# Patient Record
Sex: MALE | Race: BLACK OR AFRICAN AMERICAN
[De-identification: names, ages, dates, MRNs, and addresses within clinical notes are randomized per-mention and may not be internally consistent; named-entity substitution may affect disease eponyms.]

---

## 2018-03-23 ENCOUNTER — HOSPITAL ENCOUNTER (OUTPATIENT)
Dept: HOSPITAL 35 - PAIN | Age: 63
End: 2018-03-23
Payer: COMMERCIAL

## 2018-03-23 VITALS — BODY MASS INDEX: 27.98 KG/M2 | HEIGHT: 75 IN | WEIGHT: 225 LBS

## 2018-03-23 VITALS — SYSTOLIC BLOOD PRESSURE: 146 MMHG | DIASTOLIC BLOOD PRESSURE: 93 MMHG

## 2018-03-23 DIAGNOSIS — E78.00: ICD-10-CM

## 2018-03-23 DIAGNOSIS — G89.29: ICD-10-CM

## 2018-03-23 DIAGNOSIS — M54.42: Primary | ICD-10-CM

## 2018-03-23 DIAGNOSIS — I10: ICD-10-CM

## 2018-03-23 DIAGNOSIS — M54.41: ICD-10-CM

## 2018-04-20 ENCOUNTER — HOSPITAL ENCOUNTER (OUTPATIENT)
Dept: HOSPITAL 35 - PAIN | Age: 63
End: 2018-04-20
Payer: COMMERCIAL

## 2018-04-20 VITALS — BODY MASS INDEX: 28.35 KG/M2 | WEIGHT: 228 LBS | HEIGHT: 75 IN

## 2018-04-20 VITALS — DIASTOLIC BLOOD PRESSURE: 86 MMHG | SYSTOLIC BLOOD PRESSURE: 143 MMHG

## 2018-04-20 DIAGNOSIS — F32.9: ICD-10-CM

## 2018-04-20 DIAGNOSIS — G89.29: ICD-10-CM

## 2018-04-20 DIAGNOSIS — I10: ICD-10-CM

## 2018-04-20 DIAGNOSIS — Z88.2: ICD-10-CM

## 2018-04-20 DIAGNOSIS — M54.5: Primary | ICD-10-CM

## 2018-04-20 DIAGNOSIS — E78.5: ICD-10-CM

## 2018-04-20 DIAGNOSIS — F41.9: ICD-10-CM

## 2018-04-20 DIAGNOSIS — Z98.890: ICD-10-CM

## 2018-06-22 ENCOUNTER — HOSPITAL ENCOUNTER (OUTPATIENT)
Dept: HOSPITAL 35 - PAIN | Age: 63
End: 2018-06-22
Payer: COMMERCIAL

## 2018-06-22 VITALS — HEIGHT: 75 IN | BODY MASS INDEX: 27.53 KG/M2 | WEIGHT: 221.4 LBS

## 2018-06-22 VITALS — DIASTOLIC BLOOD PRESSURE: 107 MMHG | SYSTOLIC BLOOD PRESSURE: 168 MMHG

## 2018-06-22 DIAGNOSIS — G89.29: Primary | ICD-10-CM

## 2018-06-22 DIAGNOSIS — F32.9: ICD-10-CM

## 2018-06-22 DIAGNOSIS — E78.00: ICD-10-CM

## 2018-06-22 DIAGNOSIS — F41.9: ICD-10-CM

## 2018-06-22 DIAGNOSIS — I10: ICD-10-CM

## 2018-10-26 ENCOUNTER — HOSPITAL ENCOUNTER (OUTPATIENT)
Dept: HOSPITAL 35 - PAIN | Age: 63
End: 2018-10-26
Attending: CLINICAL NURSE SPECIALIST
Payer: COMMERCIAL

## 2018-10-26 VITALS — HEIGHT: 75 IN | BODY MASS INDEX: 26.68 KG/M2 | WEIGHT: 214.6 LBS

## 2018-10-26 VITALS — DIASTOLIC BLOOD PRESSURE: 85 MMHG | SYSTOLIC BLOOD PRESSURE: 125 MMHG

## 2018-10-26 DIAGNOSIS — M25.552: ICD-10-CM

## 2018-10-26 DIAGNOSIS — F41.9: ICD-10-CM

## 2018-10-26 DIAGNOSIS — F32.9: ICD-10-CM

## 2018-10-26 DIAGNOSIS — I63.9: ICD-10-CM

## 2018-10-26 DIAGNOSIS — M25.551: ICD-10-CM

## 2018-10-26 DIAGNOSIS — Z79.899: ICD-10-CM

## 2018-10-26 DIAGNOSIS — G89.29: ICD-10-CM

## 2018-10-26 DIAGNOSIS — M54.5: Primary | ICD-10-CM

## 2019-01-04 ENCOUNTER — HOSPITAL ENCOUNTER (OUTPATIENT)
Dept: HOSPITAL 35 - PAIN | Age: 64
End: 2019-01-04
Attending: CLINICAL NURSE SPECIALIST
Payer: COMMERCIAL

## 2019-01-04 VITALS — BODY MASS INDEX: 27.6 KG/M2 | WEIGHT: 222 LBS | HEIGHT: 75 IN

## 2019-01-04 VITALS — SYSTOLIC BLOOD PRESSURE: 148 MMHG | DIASTOLIC BLOOD PRESSURE: 89 MMHG

## 2019-01-04 DIAGNOSIS — M25.551: Primary | ICD-10-CM

## 2019-01-04 DIAGNOSIS — Z96.643: ICD-10-CM

## 2019-01-04 DIAGNOSIS — I63.9: ICD-10-CM

## 2019-01-04 DIAGNOSIS — M25.552: ICD-10-CM

## 2019-01-04 DIAGNOSIS — F32.9: ICD-10-CM

## 2019-01-04 DIAGNOSIS — F41.9: ICD-10-CM

## 2019-01-04 DIAGNOSIS — G89.29: ICD-10-CM

## 2019-01-04 NOTE — NUR
Pain Clinic Assessment:
 
1. History of Osteoarthritis:
knees
   History of Rheumatoid Arthritis:
 
 
2. Height: 6 ft. 3 in. 190.5 cm.
   Weight: 222.0 lb.  oz. 100.699 kg.
   Patient's BMI: 27.7
 
3. Vital Signs:
   BP: 148/89 Pulse: 70 Resp: 20
   Temp:  02 Sat:  ECG Mon:
 
4. Pain Intensity: 9
 
5. Fall Risk:
   Dizziness:   Needs help standing or walking:
   Fallen in the last 3 months:
   Fall risk comments:
 
 
6. Patient on Blood Thinner: None
 
7. History of Hypertension: Y
 
8. Opioid Therapy greater than 6 weeks: Y
   Opiate Contract Signed: 03/23/18
 
9. Risk Assessment Tool Provided: LOW RISK
 
10. Functional Assessment Tool: 62/70
 
11. Recreational Drug Use: Never Drug Type:
    Tobacco Use: Former Smoker Tobacco Type:
       Amount or Packs/day:  How Many Years:
    Alcohol Use: No  Frequency:  Quant:

## 2019-01-07 NOTE — HPC
[facility]
[address]
[place]   [address]                     PAIN MANAGEMENT CONSULTATION  
_______________________________________________________________________________
 
Name:       VIVIANATOMAS PICKETT           Room #:                     REG [facility] 
M..#:      [id number]                       Account #:      [id number]  
Admission:  01/04/19    Attend Phys:    Hallie Ram     
Discharge:              Date of Birth:  [birth year]  
                                                          Report #: [id number]
                                                                    [id number]   
_______________________________________________________________________________
THIS REPORT FOR:   //name//                          
 
CC: Hallie Olveraen Gamaliel
 
DATE OF SERVICE:  01/04/2019
 
 
CHIEF COMPLAINT:  Low back pain and right hip pain.
 
HISTORY OF PRESENT ILLNESS:  This is a very pleasant 63-year-old male that
returns to the pain clinic today for his chronic pain in his back and his right
hip.  He has recently had a total hip replacement at [facility] by Dr. Maguire. 
He tells me that he went to  [facility] and then recently got home at
the end of last week.  There has been some misunderstanding with some of his
orders, and he had not started home physical therapy.  He has gone a week
without any physical therapy at home, and he had seen his primary care doctor
this morning who reordered home health for him, so hopefully he will get to
start his PT soon.  He tells me that the majority of his pain today is in his
hip.  It is a 9/10, a deep dull ache, worse with weightbearing.  This is the
most that he has had to walk since he has been discharged from rehab.  He does
not complain of constipation because he had been on a good bowel program while
in the facility for rehab, but states that he thinks it is going to be a problem
because he has stopped those medications upon discharge.  The patient also tells
me that he does have an appointment with Dr. Maguire this afternoon, his
surgeon.  He would like a refill of his oxycodone and OxyContin today.
 
ALLERGIES:  SULFA.
 
CURRENT LIST OF MEDICATIONS:  OxyContin 20 mg twice a day, oxycodone 10/325
twice a day, BenGay as needed, Cymbalta 20 mg daily, Flonase daily, metoprolol
 mg daily, Flexeril 10 mg as needed, diazepam 5 mg as needed and
hydrochlorothiazide 25 mg daily.
 
PQRS:
1.  He has a history of osteoarthritis in his knees and his hips bilaterally. 
Denies rheumatoid arthritis.
2.  Height is 6 feet 3 inches, weight is 222, BMI is 27.7.  Vital signs 148/89,
pulse of 70, respirations 20, oxygen sat is 100%.
3.  Pain score is 9/10.
4.  Fall risk.  He denies dizziness, does not need help walking or standing, has
not fallen in the last 3 months.
5.  Not on any current blood thinners.  Did take Lovenox while in
rehabilitation.  Last dose was 12/29.  Does have a history of hypertension and
takes medications.
6.  Opioid therapy is greater than 6 weeks, therefore, an opioid signed contract
 
 
 
[address]   [address]                     PAIN MANAGEMENT CONSULTATION  
_______________________________________________________________________________
 
Name:       TOMAS MICHELLE           Room #:                     REG MELECIO LEE#:      [id number]                       Account #:      [id number]  
Admission:  01/04/19    Attend Phys:    Hallie Ram     
Discharge:              Date of Birth:  [birth year]  
                                                          Report #: [id number]
                                                                    [id number]   
_______________________________________________________________________________
is on the chart.  His risk assessment tool is low, and his functional assessment
is 62:70.
7.  He denies recreational drug use.  He is a former smoker and does not drink
alcohol.  We did check prescription monitoring system.  He did fill
prescriptions from his surgeon, which made us aware of and otherwise just
medicines from our physicians.  The patient tells me that he does safeguard his
medications.
 
PHYSICAL EXAMINATION: 
GENERAL: This is a well-developed, well-nourished black gentleman of 63-year-old
that appears his stated age.  He is alert and orientated and his affect is
appropriate.
HEENT:  Normocephalic, atraumatic.  Extraocular eye muscles are intact.  Mucous
membranes are moist, and his hearing is within normal limits.
NECK:  Without adenopathy.  He has good range of motion and no JVD.
EXTREMITIES:  Upper extremity strength judged to be 5/5.  Lower extremity is
without kyphosis, lordosis or scoliosis.  His lower extremity strength judged to
be 4/5 on the right and 5/5 on the left.  He is using a cane today.  He has an
antalgic gait.  The patient tells me he has a new scar on his left hip, but we
did not look at that today.  Some slight swelling in his lower extremities of 1+
edema on his right lower leg.
 
IMPRESSION:
1.  Chronic pain, status post lumbar surgery.
2.  Bilateral hip pain with replacement bilaterally.
3.  Depression.
4.  Anxiety.
5.  Cardiovascular accident.
6.  Complex medical management for his chronic pain.
 
We reviewed the fact that opiate medications are being used to provide analgesia
adequate to support activities of daily living, not attempting to achieve a
specific pain score on the 0-10 Visual Analog Scale.  The current opiate
medications are providing sufficient analgesia to allow the patient to
participate in activities of daily living.  The patient is not exhibiting any
aberrant behavior suggestive of drug diversion.  The patient is not having any
adverse reactions to medications.  The patient is not suffering from daytime
somnolence or mental acuity changes.  The patient is managing opiate-induced
constipation with appropriate over-the-counter agents and dietary
considerations.  The patient was counseled on concern for caution with operating
a motor vehicle while using opiate medications.
 
A physical exam was performed and the patient's functional status was evaluated.
 All patients with back pain were advised against the bed rest greater than 4
days and were advised to return to normal activities.  Pain score assessment was
noted and the treatment plan was reviewed with the patient.  All current
 
 
 
[facility]
[address]
[place]   [address]                     PAIN MANAGEMENT CONSULTATION  
_______________________________________________________________________________
 
Name:       TERRYTOMAS JEFFERY           Room #:                     REG MELECIO LEE#:      [id number]                       Account #:      [id number]  
Admission:  01/04/19    Attend Phys:    Hallie DENNY Ram     
Discharge:              Date of Birth:  [birth year]  
                                                          Report #: [id number]
                                                                    [id number]   
_______________________________________________________________________________
medications, both prescribed and OTC were reviewed and reconciled on the
electronic medical record.  Tobacco screening was accomplished and smoking
cessation was advised when indicated.  BMI was noted and diet/exercise
modification was recommended for all patients following outside normal
parameters.
 
I reviewed with the patient today their responsibilities to safeguard
prescription medications, reviewed their responsibility to utilize medications
only as prescribed by the physician.  They are to seek and receive pain
medications only from 1 physician group ( [facility]).  They are to use 1
pharmacy and keep the clinic informed if they change pharmacies.  Their
responsibilities include making followup visits in a timely fashion and to avoid
abrupt discontinuation of medication usage.  Their responsibilities further
include bringing their medications (bottles from the pharmacy with residual
pills) to the visit for possible confirmation of pill counts and the patient
understands it is their responsibility to submit to random drug screens to
ensure both that the medications prescribed are present, and that no other
controlled substances are present.  All prescriptions provided today were
generated electronically.
 
PLAN: 
1.  We discussed treatment options with the patient today.  The patient tells me
that his pain has not been well controlled since he had his right hip replaced
at [facility].  He tells me they gave him a different short acting medication that
was not as helpful.  He is anxious to return to his short-acting oxycodone
10/325.  He continued to take his OxyContin 20 mg twice a day.  Scripts given
for both #60 with release of 4 today and for a week releases.
2.  I noticed and remembered that the patient had to stop his Meloxicam prior to
his surgery.  The patient has not restarted this.  This also could cause some of
his increase in his general aches and pains.  He has an appointment with Dr. Maguire this afternoon.  The patient is to ask him when he is able to restart
his meloxicam.
3.  We discussed constipation issues.  The patient had been doing well.  No
problems with constipation, but is fearing that since he had stopped his
medicine that he took in the rehab facility that it will start.  We addressed
taking MiraLax and Senokot, which are both over-the-counter medications.  The
patient wrote these down to look at the pharmacy.
4.  The patient will again start therapy per Dr. Alston's office for his hip
and hopeful that he will have decreasing pain over the next few months, we will
see him in 2 months' time.  At that time, we will address if we are able to
decrease any of his medications, but he does take these for his ongoing low back
pain as well as hip pain, so we may be unable to reduce them, but we will
reevaluate it at his 2-month appointment.  The patient is agreeable with this
 
 
 
[facility]
[address] [place]   [address]                     PAIN MANAGEMENT CONSULTATION  
_______________________________________________________________________________
 
Name:       TOMAS MICHELLE           Room #:                     REG MELECIO LEE#:      [id number]                       Account #:      [id number]  
Admission:  01/04/19    Attend Phys:    Hallie Ram     
Discharge:              Date of Birth:  [birth year]  
                                                          Report #: [id number]
                                                                    [id number]   
_______________________________________________________________________________
plan of care.  The patient is seen in collaboration today with Dr. Patricio Lewis.
 
 
 
 
 
 
 
 
 
 
 
 
 
 
 
 
 
 
 
 
 
 
 
 
 
 
 
 
 
 
 
 
 
 
 
 
 
 
 
 
 
 
  <ELECTRONICALLY SIGNED>
   By: Hallie Ram             
  01/07/19     0743
D: 01/04/19 1019                           _____________________________________
T: 01/04/19 1201                           Hallie Ram               /nt

## 2019-03-20 ENCOUNTER — HOSPITAL ENCOUNTER (OUTPATIENT)
Dept: HOSPITAL 35 - PAIN | Age: 64
End: 2019-03-20
Payer: COMMERCIAL

## 2019-03-20 VITALS — SYSTOLIC BLOOD PRESSURE: 150 MMHG | DIASTOLIC BLOOD PRESSURE: 110 MMHG

## 2019-03-20 VITALS — HEIGHT: 75 IN | BODY MASS INDEX: 29.83 KG/M2 | WEIGHT: 239.9 LBS

## 2019-03-20 DIAGNOSIS — Z86.73: ICD-10-CM

## 2019-03-20 DIAGNOSIS — Z96.641: ICD-10-CM

## 2019-03-20 DIAGNOSIS — Z88.8: ICD-10-CM

## 2019-03-20 DIAGNOSIS — F32.9: ICD-10-CM

## 2019-03-20 DIAGNOSIS — F41.9: ICD-10-CM

## 2019-03-20 DIAGNOSIS — Z79.899: ICD-10-CM

## 2019-03-20 DIAGNOSIS — G89.29: ICD-10-CM

## 2019-03-20 DIAGNOSIS — E78.00: Primary | ICD-10-CM

## 2019-03-20 NOTE — NUR
Pain Clinic Assessment:
 
1. History of Osteoarthritis:
knees
   History of Rheumatoid Arthritis:
 
 
2. Height: 6 ft. 3 in. 190.5 cm.
   Weight: 239.9 lb.  oz. 108.818 kg.
   Patient's BMI: 30.0
 
3. Vital Signs:
   BP: 150/110 Pulse: 16 Resp: 99
   Temp:  02 Sat: 98 ECG Mon:
 
4. Pain Intensity: 6-7
 
5. Fall Risk:
   Dizziness: Y  Needs help standing or walking: Y
   Fallen in the last 3 months: N
   Fall risk comments:
 
 
6. Patient on Blood Thinner: None
 
7. History of Hypertension: Y
 
8. Opioid Therapy greater than 6 weeks: Y
   Opiate Contract Signed: 03/23/18
 
9. Risk Assessment Tool Provided: LOW RISK
 
10. Functional Assessment Tool: 62/70
 
11. Recreational Drug Use: Never Drug Type:
    Tobacco Use: Former Smoker Tobacco Type:
       Amount or Packs/day:  How Many Years:
    Alcohol Use: No  Frequency:  Quant:

## 2019-04-17 ENCOUNTER — HOSPITAL ENCOUNTER (OUTPATIENT)
Dept: HOSPITAL 35 - PAIN | Age: 64
End: 2019-04-17
Payer: COMMERCIAL

## 2019-04-17 VITALS — WEIGHT: 227.2 LBS | HEIGHT: 75 IN | BODY MASS INDEX: 28.25 KG/M2

## 2019-04-17 VITALS — DIASTOLIC BLOOD PRESSURE: 101 MMHG | SYSTOLIC BLOOD PRESSURE: 145 MMHG

## 2019-04-17 DIAGNOSIS — Z96.641: ICD-10-CM

## 2019-04-17 DIAGNOSIS — G89.29: Primary | ICD-10-CM

## 2019-04-17 DIAGNOSIS — Z79.899: ICD-10-CM

## 2019-04-17 DIAGNOSIS — F32.9: ICD-10-CM

## 2019-04-17 DIAGNOSIS — M54.5: ICD-10-CM

## 2019-04-17 DIAGNOSIS — I10: ICD-10-CM

## 2019-04-17 DIAGNOSIS — F41.9: ICD-10-CM

## 2019-04-17 DIAGNOSIS — Z86.73: ICD-10-CM

## 2019-04-17 NOTE — NUR
Pain Clinic Assessment:
 
1. History of Osteoarthritis:
knees
   History of Rheumatoid Arthritis:
 
 
2. Height: 6 ft. 3 in. 190.5 cm.
   Weight: 227.2 lb.  oz. 103.057 kg.
   Patient's BMI: 28.4
 
3. Vital Signs:
   BP: 145/101 Pulse: 110 Resp: 20
   Temp:  02 Sat: 99 ECG Mon:
 
4. Pain Intensity: 8
 
5. Fall Risk:
   Dizziness: Y  Needs help standing or walking: Y
   Fallen in the last 3 months: N
   Fall risk comments:
 
 
6. Patient on Blood Thinner: None
 
7. History of Hypertension: Y
 
8. Opioid Therapy greater than 6 weeks: Y
   Opiate Contract Signed: 03/23/18
 
9. Risk Assessment Tool Provided: LOW RISK
 
10. Functional Assessment Tool: 62/70
 
11. Recreational Drug Use: Never Drug Type:
    Tobacco Use: Former Smoker Tobacco Type:
       Amount or Packs/day:  How Many Years:
    Alcohol Use: No  Frequency:  Quant:

## 2019-07-03 ENCOUNTER — HOSPITAL ENCOUNTER (OUTPATIENT)
Dept: HOSPITAL 35 - PAIN | Age: 64
End: 2019-07-03
Payer: COMMERCIAL

## 2019-07-03 VITALS — WEIGHT: 240 LBS | HEIGHT: 75 IN | BODY MASS INDEX: 29.84 KG/M2

## 2019-07-03 VITALS — SYSTOLIC BLOOD PRESSURE: 149 MMHG | DIASTOLIC BLOOD PRESSURE: 102 MMHG

## 2019-07-03 DIAGNOSIS — M54.5: Primary | ICD-10-CM

## 2019-07-03 DIAGNOSIS — Z79.899: ICD-10-CM

## 2019-07-03 DIAGNOSIS — Z96.641: ICD-10-CM

## 2019-07-03 DIAGNOSIS — E78.00: ICD-10-CM

## 2019-07-03 DIAGNOSIS — F32.9: ICD-10-CM

## 2019-07-03 DIAGNOSIS — G89.29: ICD-10-CM

## 2019-07-03 DIAGNOSIS — I10: ICD-10-CM

## 2019-07-03 DIAGNOSIS — F41.9: ICD-10-CM

## 2019-07-03 NOTE — NUR
Pain Clinic Assessment:
 
1. History of Osteoarthritis:
knees
   History of Rheumatoid Arthritis:
NONE
 
2. Height: 6 ft. 3 in. 190.5 cm.
   Weight: 240.0 lb.  oz. 108.864 kg.
   Patient's BMI: 30.0
 
3. Vital Signs:
   BP: 149/102 Pulse: 74 Resp: 16
   Temp:  02 Sat: 99 ECG Mon:
 
4. Pain Intensity: 5
 
5. Fall Risk:
   Dizziness: N  Needs help standing or walking: N
   Fallen in the last 3 months: N
   Fall risk comments:
 
 
6. Patient on Blood Thinner: None
 
7. History of Hypertension: Y
 
8. Opioid Therapy greater than 6 weeks: Y
   Opiate Contract Signed: 03/23/18
 
9. Risk Assessment Tool Provided: LOW RISK
 
10. Functional Assessment Tool: 62/70
 
11. Recreational Drug Use: Never Drug Type:
    Tobacco Use: Former Smoker Tobacco Type:
       Amount or Packs/day:  How Many Years:
    Alcohol Use: No  Frequency:  Quant:

## 2019-09-27 ENCOUNTER — HOSPITAL ENCOUNTER (OUTPATIENT)
Dept: HOSPITAL 35 - PAIN | Age: 64
End: 2019-09-27
Payer: COMMERCIAL

## 2019-09-27 VITALS — HEIGHT: 75 IN | BODY MASS INDEX: 28.42 KG/M2 | WEIGHT: 228.6 LBS

## 2019-09-27 VITALS — DIASTOLIC BLOOD PRESSURE: 85 MMHG | SYSTOLIC BLOOD PRESSURE: 125 MMHG

## 2019-09-27 DIAGNOSIS — Z88.2: ICD-10-CM

## 2019-09-27 DIAGNOSIS — F32.9: ICD-10-CM

## 2019-09-27 DIAGNOSIS — Z79.891: ICD-10-CM

## 2019-09-27 DIAGNOSIS — E78.00: ICD-10-CM

## 2019-09-27 DIAGNOSIS — Z79.899: ICD-10-CM

## 2019-09-27 DIAGNOSIS — I10: ICD-10-CM

## 2019-09-27 DIAGNOSIS — Z96.641: ICD-10-CM

## 2019-09-27 DIAGNOSIS — F41.9: ICD-10-CM

## 2019-09-27 DIAGNOSIS — M54.5: Primary | ICD-10-CM

## 2019-09-27 NOTE — NUR
Pain Clinic Assessment:
 
1. History of Osteoarthritis:
knees
   History of Rheumatoid Arthritis:
NONE
 
2. Height: 6 ft. 3 in. 190.5 cm.
   Weight: 228.6 lb.  oz. 103.692 kg.
   Patient's BMI: 28.6
 
3. Vital Signs:
   BP: 125/85 Pulse: 99 Resp: 16
   Temp:  02 Sat: 99 ECG Mon:
 
4. Pain Intensity: 5
 
5. Fall Risk:
   Dizziness: N  Needs help standing or walking: N
   Fallen in the last 3 months: N
   Fall risk comments:
 
 
6. Patient on Blood Thinner: None
 
7. History of Hypertension: Y
 
8. Opioid Therapy greater than 6 weeks: Y
   Opiate Contract Signed: 03/23/18
 
9. Risk Assessment Tool Provided: LOW RISK
 
10. Functional Assessment Tool: 62/70
 
11. Recreational Drug Use: Never Drug Type:
    Tobacco Use: Former Smoker Tobacco Type:
       Amount or Packs/day:  How Many Years:
    Alcohol Use: No  Frequency:  Quant:

## 2019-10-11 NOTE — HPC
[facility]
[address]
[place]   [address]                     PAIN MANAGEMENT CONSULTATION  
_______________________________________________________________________________
 
Name:       TOMAS MICHELLE           Room #:                     REG [facility] 
JESUS#:      [id number]                       Account #:      [id number]  
Admission:  19    Attend Phys:    JANNA Lewis MD    
Discharge:              Date of Birth:  [birth year]  
                                                          Report #: [id number]
                                                                    [id number]   
_______________________________________________________________________________
THIS REPORT FOR:   //name//                          
 
CC: JANNA Alston
 
DATE OF SERVICE:  2019
 
 
CHIEF COMPLAINT:  Here for medication renewal.
 
HISTORY:  The patient is a 64-year-old .  As you may recall, he has
had problems with his low back.  He has had back surgery.  Also has problems
with his hips.  He has had a hip replacement.  Finds that his pain continues to
be somewhat problematic.  It is improved with use of his medications.  He
continues to convalesce and increase his level of activity.  He has noted a
cough for about a week.  He thinks that it might be associated with the
worsening of the RAGWEED and other types of allergic agents.  He has been having
some problems with severe insomnia.  These have kept him up.  Has pain in his
low back, right hip, right knee, and virtually all of his joints.  He rates his
pain as a 5/10.  Notes the pain is worse with walking, standing, sitting and
improves somewhat when he lies down.  He tries to get as much rest as possible.
 
ALLERGIES:  SULFA.
 
CURRENT MEDICATIONS:  Cymbalta 20 mg, Flonase daily, metoprolol
 mg, Flexeril 10 mg, diazepam, and hydrochlorothiazide 25 mg.
 
PAIN CLINIC ASSESSMENT/PQRS:
1.  The patient is being treated for osteoarthritis and has had knee surgery as
well as hip surgery.  He is not being treated for rheumatoid arthritis.
2.  Height 6 feet 3 inches, weight 228 pounds, BMI is 28.6.
3.  Vital signs:  Blood pressure 125/85, pulse 99, respiratory rate 16, room air
saturation 99%.
4.  Pain intensity, 5/10.
5.  Fall history:  The patient has not fallen in the last 3 months.
6.  Blood thinner.  The patient is not on a blood thinning medication.
7.  Hypertension.  The patient is being treated for hypertension.
8.  Opioids greater than 6 weeks.  The patient receives medication from one
source the [facility].
9.  Risk assessment tool, low for opioid use.
10.  Functional assessment tool, 62/70.
11.  Recreational drug use.  The patient denies.
12.  Tobacco:  The patient is a former smoker.
13.  Alcohol.  The patient denies frequent use of alcoholic beverages.
 
PHYSICAL EXAMINATION:
 
 
 
[facility]
[address] [place]   [address]                     PAIN MANAGEMENT CONSULTATION  
_______________________________________________________________________________
 
Name:       TOMAS MICHELLE           [facility]                     [id number]                       Account #:      [id number]  
Admission:  19    Attend Phys:    JANNA Lewis MD    
Discharge:              Date of Birth:  [birth year]  
                                                          Report #: [id number]
                                                                    [id number]   
_______________________________________________________________________________
GENERAL:  The patient is a well-developed, well-nourished black male, appears
his stated age.  He is alert and oriented x 3.  His affect is appropriate. 
Speech is fluent.
HEENT:  Normocephalic, atraumatic.  Extraocular eye muscles intact.  Sclerae
nonicteric.  Mucous membranes are moist.
NECK:  Without adenopathy or JVD.
HEART:  Regular rate.
ABDOMEN:  Nontender.  Bowel sounds present.  The patient has a slight cough.
MUSCULOSKELETAL:  Upper extremity muscle strength judged to be 5/5 for the major
muscle groups in the upper extremity.  Lower extremity, the patient walks with a
cane.  Has an antalgic gait.  Lower muscle strength judged to be 4+ on the left
and 4+ on the right.  Uses hands to go from a sitting to a standing position.
 
IMPRESSION:
1.  Chronic pain, status post low back pain.
2.  Status post right hip replacement.
3.  Hypercholesterolemia.
4.  Depression.
5.  Anxiety.
6.  Cardiovascular accident in the past.
7.  Complex medical management using opioids.
8.  Hypertension.
 
RECOMMENDATIONS:  We discussed treatment options with the patient.  At this
juncture, we will continue with his medications.  He feels that the medications
are helpful.  The patient is aware that opioid medications can become less
effective over time.  He is aware that 70,000 people  last year as a result
of overdose on medication.  He has taken the medication as prescribed.  He does
not appear to be showing signs of addiction.  He keeps his medications in a
guarded area.  He continues to increase his activity as much as possible.  He
will call us if he has any concerns.  We will continue to help control the pain
using a complex medical management using opioids.  A script for his medications
has been written.  He will continue with OxyContin 20 mg one p.o. b.i.d.,
Percocet 10/325 one p.o. b.i.d., Flexeril 10 mg 1 p.o. t.i.d.  He will call us
if he has any concerns.
 
We would like to thank you for letting us participate in his care.  We hope he
continues to improve.
 
 
 
 
 
 
  <ELECTRONICALLY SIGNED>
   By: JANNA Lewis MD            
  10/11/19     0826
D: 10/07/19 2104                           _____________________________________
T: 10/08/19 0143                           JANNA Lewis MD              /nt

## 2019-12-04 ENCOUNTER — HOSPITAL ENCOUNTER (OUTPATIENT)
Dept: HOSPITAL 35 - PAIN | Age: 64
End: 2019-12-04
Payer: COMMERCIAL

## 2019-12-04 VITALS — DIASTOLIC BLOOD PRESSURE: 105 MMHG | SYSTOLIC BLOOD PRESSURE: 165 MMHG

## 2019-12-04 VITALS — HEIGHT: 75 IN | BODY MASS INDEX: 31.46 KG/M2 | WEIGHT: 253 LBS

## 2019-12-04 DIAGNOSIS — F41.9: ICD-10-CM

## 2019-12-04 DIAGNOSIS — M79.642: Primary | ICD-10-CM

## 2019-12-04 DIAGNOSIS — I10: ICD-10-CM

## 2019-12-04 DIAGNOSIS — Z96.641: ICD-10-CM

## 2019-12-04 DIAGNOSIS — F32.9: ICD-10-CM

## 2019-12-04 DIAGNOSIS — E78.00: ICD-10-CM

## 2019-12-04 DIAGNOSIS — M79.641: ICD-10-CM

## 2019-12-04 NOTE — NUR
Pain Clinic Assessment:
 
1. History of Osteoarthritis:
knees
HIPS
   History of Rheumatoid Arthritis:
DENIES
 
2. Height: 6 ft. 3 in. 190.5 cm.
   Weight: 253.0 lb.  oz. 114.760 kg.
   Patient's BMI: 31.6
 
3. Vital Signs:
   BP: 165/105 Pulse: 93 Resp: 15
   Temp:  02 Sat: 97 ECG Mon:
 
4. Pain Intensity: 5-6
 
5. Fall Risk:
   Dizziness: N  Needs help standing or walking: N
   Fallen in the last 3 months: N
   Fall risk comments:
 
 
6. Patient on Blood Thinner: None
 
7. History of Hypertension: Y
 
8. Opioid Therapy greater than 6 weeks: Y
   Opiate Contract Signed: 12/04/19
 
9. Risk Assessment Tool Provided: 1-LOW
 
10. Functional Assessment Tool: 62/70
 
11. Recreational Drug Use: Never Drug Type:
    Tobacco Use: Former Smoker Tobacco Type:
       Amount or Packs/day:  How Many Years:
    Alcohol Use: Yes  Frequency: Special Occasions Quant:

## 2019-12-18 NOTE — HPC
[facility]
[address]
[place]   [address]                     PAIN MANAGEMENT CONSULTATION  
_______________________________________________________________________________
 
Name:       TOMAS MICHELLE           [facility] #[facility]                     [facility]      [id number]                       Account #:      [id number]  
Admission:  12/04/19    Attend Phys:    JANNA Lewis MD    
Discharge:              Date of Birth:  [birth year]  
                                                          Report #: [id number]
                                                                    [id number]   
_______________________________________________________________________________
THIS REPORT FOR:   //name//                          
 
CC: JANNA Alston
 
DATE OF SERVICE:  12/04/2019
 
 
CHIEF COMPLAINT:  "Increased pain in the hands and I think I might have an
abscess on the left upper jaw."
 
HISTORY:  The patient is a 64-year-old gentleman who has been followed in the
pain clinic because of chronic pain.  It involves his low back.  He has had back
surgery.  He has had hip replacements.  He is experiencing some increased pain
and discomfort in the left upper jaw area.  He feels that there might be an
abscess there.  He is contemplating going to the dentist.  He has not had any
fevers or chills.  Feels that the upper area and needs dental attention.  He has
returned today for renewal of his medications.  Does continue to have some right
hip pain and low back pain.
 
ALLERGIES:  SULFA.
 
CURRENT MEDICATIONS:  OxyContin 20 mg b.i.d., Oxycodone 10 mg b.i.d., Cymbalta
20 mg, Flonase daily, metoprolol  mg, Flexeril 10 mg, diazepam,
hydrochlorothiazide 25 mg.
 
PAIN CLINIC ASSESSMENT AND PQRS:
1.  The patient has some osteoarthritic changes involving his knees and hips. 
He denies rheumatoid arthritis.
2.  Height 6 feet 3 inches, weight 253 pounds, and BMI is 31.6.
3.  Vital signs:  Blood pressure 165/105, pulse 93, respiratory rate 15, room
air saturation 97%.
4.  Pain intensity, 5-6/10.
5.  Fall history, the patient has not fallen in the last 3 months.
6.  Blood thinner, the patient is not on a blood thinning medication.
7.  Hypertension, the patient is being treated for hypertension.
8.  Opioids greater than 6 weeks, the patient receives medication from one
source, the pain clinic.
9.  Risk assessment tool, 62/70.
10.  Recreational drug use, the patient denies.
11.  Tobacco, the patient is a former smoker.
12.  Alcohol, the patient occasionally drinks alcoholic beverages.
 
PHYSICAL EXAMINATION:
GENERAL:  The patient is a well-developed, well-nourished, black male.  He
appears his stated age.  He is alert and oriented x 3.  His affect is
 
 
 
[place] [address]                     PAIN MANAGEMENT CONSULTATION  
_______________________________________________________________________________
 
Name:       TOMAS MICHELLE           Room #:                     REG [facility]#:      [id number]                       Account #:      [id number]  
Admission:  12/04/19    Attend Phys:    JANNA Lewis MD    
Discharge:              Date of Birth:  [birth year]  
                                                          Report #: [id number]
                                                                    [id number]   
_______________________________________________________________________________
appropriate.  Speech is fluent.
HEENT:  Normocephalic, atraumatic.  Extraocular eye muscles intact.  The patient
complains of some puffiness, swelling, and discomfort in the right upper jaw
area.  He feels that he has a tooth abscess.
NECK:  Without adenopathy or JVD.
HEART:  Regular rate.
ABDOMEN:  Nontender.  Bowel sounds present.
MUSCULOSKELETAL:  Upper extremity muscle strength judged to be 5/5 for the major
muscle groups in the upper extremity.  Lower extremity, the patient continues to
walk with an antalgic gait.  He is without significant scoliosis, kyphosis or
lordosis.
 
IMPRESSION:
1.  Chronic pain, status post back surgery.
2.  Status post right hip replacement.
3.  New onset of upper jaw pain with probable an infection.
4.  Depression.
5.  Hypercholesterolemia.
6.  Anxiety.
7.  Cardiovascular accident in the past.
8.  Complex medical management of pain with using opioids.
9.  Hypertension.
 
RECOMMENDATIONS:  We discussed treatment options with the patient.  At this
juncture, he feels the medications continue to be helpful.  He would like to
continue with these medications.  He is taking them as prescribed.  Keeps his
medications in a guarded area.  He is aware that opioid medications can be
problematic in some patients.  He does not show any signs of addiction.  He has
taken the medication as prescribed.  He has noted some pain in his left upper
jaw area.  Feels that he has a tooth infection.  We have explained to him the
need to have this looked as soon as possible.  I explained the possible
pathophysiology associated with inflammation and an infection, which could seed
his bloodstream and possibly affect the cardiac valves.  He states that he was
going to go by his primary physician and go as soon as possible to a dentist for
treatment.  States that he usually gets antibiotics prior to dental procedures.
 
A script for his medications of OxyContin 20 mg 1 p.o. b.i.d. has been written. 
Also, a script for oxycodone 10 mg 1 p.o. b.i.d. have been written.  The patient
also will continue with Flexeril 10 mg 1 p.o. t.i.d. for muscle spasms.
 
We would like to thank you for letting us participate in his care.  We hope he
continues to improve.
 
 
  <ELECTRONICALLY SIGNED>
   By: JANNA Lewis MD            
  12/18/19     1308
D: 12/10/19 2257                           _____________________________________
T: 12/11/19 0602                           JANNA Lewis MD              /BENJI

## 2020-02-05 ENCOUNTER — HOSPITAL ENCOUNTER (OUTPATIENT)
Dept: HOSPITAL 35 - PAIN | Age: 65
End: 2020-02-05
Payer: COMMERCIAL

## 2020-02-05 VITALS — WEIGHT: 259.2 LBS | HEIGHT: 75 IN | BODY MASS INDEX: 32.23 KG/M2

## 2020-02-05 VITALS — SYSTOLIC BLOOD PRESSURE: 161 MMHG | DIASTOLIC BLOOD PRESSURE: 106 MMHG

## 2020-02-05 DIAGNOSIS — Z88.2: ICD-10-CM

## 2020-02-05 DIAGNOSIS — Z79.899: ICD-10-CM

## 2020-02-05 DIAGNOSIS — F32.9: ICD-10-CM

## 2020-02-05 DIAGNOSIS — Z96.641: ICD-10-CM

## 2020-02-05 DIAGNOSIS — F41.9: ICD-10-CM

## 2020-02-05 DIAGNOSIS — M54.5: Primary | ICD-10-CM

## 2020-02-05 DIAGNOSIS — E78.00: ICD-10-CM

## 2020-02-05 DIAGNOSIS — G89.29: ICD-10-CM

## 2020-02-05 DIAGNOSIS — I10: ICD-10-CM

## 2020-02-05 NOTE — NUR
Pain Clinic Assessment:
 
1. History of Osteoarthritis:
knees
HIPS
   History of Rheumatoid Arthritis:
DENIES
 
2. Height: 6 ft. 3 in. 190.5 cm.
   Weight: 259.2 lb.  oz. 117.573 kg.
   Patient's BMI: 32.4
 
3. Vital Signs:
   BP: 161/106 Pulse: 96 Resp: 16
   Temp:  02 Sat: 96 ECG Mon:
 
4. Pain Intensity: 9
 
5. Fall Risk:
   Dizziness: N  Needs help standing or walking: N
   Fallen in the last 3 months: N
   Fall risk comments:
 
 
6. Patient on Blood Thinner: None
 
7. History of Hypertension: Y
 
8. Opioid Therapy greater than 6 weeks: Y
   Opiate Contract Signed: 12/04/19
 
9. Risk Assessment Tool Provided: 1-LOW
 
10. Functional Assessment Tool: 62/70
 
11. Recreational Drug Use: Never Drug Type:
    Tobacco Use: Former Smoker Tobacco Type:
       Amount or Packs/day:  How Many Years:
    Alcohol Use: No  Frequency:  Quant:

## 2020-02-26 NOTE — HPC
[facility]
[address]
[place]   [address]                     PAIN MANAGEMENT CONSULTATION  
_______________________________________________________________________________
 
Name:       TOMAS MICHELLE           Room #:                     REG [facility].#:      [id number]                       Account #:      [id number]  
Admission:  02/05/20    Attend Phys:    JANNA Lewis MD    
Discharge:              Date of Birth:  [birth year]  
                                                          Report #: [id number]
                                                                    [id number]   
_______________________________________________________________________________
THIS REPORT FOR:  
 
cc:  Juliocesar Alston,JANNA Woody MD                                            ~
CC: JANNA Alston
 
DATE OF SERVICE:  02/05/2020
 
 
FOLLOWUP HISTORY:  Back pain in the low back and pretty much in all joints,
particularly the hips.
 
HISTORY:  The patient is a 64-year-old gentleman who has been followed in the
pain clinic.  As you recall, he suffers from chronic pain.  He has had hip
replacements.  He continues to note pain and discomfort in the back area.  He
rates his pain as a 9/10 today.  He notes that activities such as walking,
standing are problematic.  The weather pattern has changed.  He feels that this
has contributed to the increase in back discomfort as well.
 
ALLERGIES:  SULFA.
 
CURRENT MEDICATIONS:  OxyContin 20 mg b.i.d., oxycodone 10 mg b.i.d., Cymbalta
20 mg, Flonase daily, metoprolol  mg, Flexeril 10 mg, diazepam,
hydrochlorothiazide 25 mg.
 
PAIN CLINIC ASSESSMENT AND PQRS:
1.  The patient does have some osteoarthritic changes involving his knees as
well as his hips, which have been replaced.  He denies rheumatoid arthritis.
2.  Height 6 feet 3 inches, weight 259 pounds, BMI is 32.4.
3.  Vital Signs:  Blood pressure 161/106.
4.  Pulse:  96.
5.  Respiratory rate:  16, room air saturation 96%.
6.  Pain intensity:  9/10.
7.  Fall risk:  The patient has not fallen in the last 3 months.
8.  Blood thinner:  The patient is not on a blood thinning medication.
9.  Hypertension:  The patient is being treated for hypertension.
10.  Opioids greater than 6 weeks:  The patient receives medications from one
source, the pain clinic.
11.  Risk assessment tool:  Low for opioid use.
12.  Functional assessment tool:  62/70.
13.  Recreational drug use:  The patient denies.
14.  Tobacco:  The patient is a former smoker.
15.  Alcohol:  The patient denies frequent use of alcoholic beverages.
 
 
 
 
[facility]
[address] [place]   [address]                     PAIN MANAGEMENT CONSULTATION  
_______________________________________________________________________________
 
Name:       TOMAS MICHELLE           Room #:                     REG CLI 
[facility]#:      [id number]                       Account #:      [id number]  
Admission:  02/05/20    Attend Phys:    JANNA Lewis MD    
Discharge:              Date of Birth:  [birth year]  
                                                          Report #: [id number]
                                                                    [id number]   
_______________________________________________________________________________
PHYSICAL EXAMINATION:
GENERAL:  The patient is a well-developed, well-nourished black male, appears
his stated age.  He is alert and oriented x 3.  His affect is appropriate. 
Speech is fluent.
HEENT:  Normocephalic, atraumatic.  Extraocular eye muscles intact.  Sclerae
nonicteric.  Mucous membranes are moist.
NECK:  Without adenopathy or JVD.
HEART:  Regular rate.
ABDOMEN:  Nontender.  Bowel sounds present.
EXTREMITIES:  Upper extremity muscle strength judged to be 5/5 for the major
muscle groups in the upper extremity.  The patient has pain and discomfort in
the lower extremities and continues to have some pain with an antalgic walk.  He
also complains of generalized pain in most of his joints.  He is without
significant scoliosis, kyphosis or lordosis.
 
IMPRESSION:
1.  Chronic pain, status post back surgery.
2.  Status post right hip replacement.
3.  Depression.
4.  Hypercholesterolemia.
5.  Anxiety.
6.  Cardiovascular accident in the past.
7.  Complex medical management of pain using opioids.
8.  Hypertension.
9.  Somewhat global pain and discomfort.
 
RECOMMENDATIONS:  We discussed treatment options with the patient.  At this
juncture, we will continue with his medications.  He finds his medications
continue to be helpful.  He keeps them in a guarded area.  He is aware that the
medications can become less effective as time goes on.  He is aware of the
addictive qualities of opioid medications.  He will continue with his medication
as prescribed.  At this juncture, because of this generalized pain he is
experiencing, we will try a Medrol Dosepak.  Hopefully, this will help calm down
the inflamation 
in joints that are sore.  He will also continue with Percocet 10 mg 1
p.o. b.i.d.  The patient will continue with the OxyContin 20 mg b.i.d.  He will
call us if he has any problems.  The patient will continue with metoprolol XL
100 mg daily.
 
We would like to thank you for letting us participate in his care.  We hope he
continues to improve.
 
 
 
  <ELECTRONICALLY SIGNED>
   By: JANNA Lewis MD            
  02/26/20     0902
D: 02/25/20 2204                           _____________________________________
T: 02/26/20 0401                           JANNA Lewis MD              /nt

## 2020-04-08 ENCOUNTER — HOSPITAL ENCOUNTER (OUTPATIENT)
Dept: HOSPITAL 35 - PAIN | Age: 65
End: 2020-04-08
Payer: COMMERCIAL

## 2020-04-08 VITALS — HEIGHT: 75 IN | WEIGHT: 259.8 LBS | BODY MASS INDEX: 32.3 KG/M2

## 2020-04-08 VITALS — SYSTOLIC BLOOD PRESSURE: 157 MMHG | DIASTOLIC BLOOD PRESSURE: 105 MMHG

## 2020-04-08 DIAGNOSIS — F41.9: ICD-10-CM

## 2020-04-08 DIAGNOSIS — Z88.2: ICD-10-CM

## 2020-04-08 DIAGNOSIS — M54.5: Primary | ICD-10-CM

## 2020-04-08 DIAGNOSIS — E78.00: ICD-10-CM

## 2020-04-08 DIAGNOSIS — Z96.698: ICD-10-CM

## 2020-04-08 DIAGNOSIS — F11.20: ICD-10-CM

## 2020-04-08 DIAGNOSIS — Z96.643: ICD-10-CM

## 2020-04-08 DIAGNOSIS — G89.29: ICD-10-CM

## 2020-04-08 DIAGNOSIS — F32.9: ICD-10-CM

## 2020-04-08 DIAGNOSIS — Z79.1: ICD-10-CM

## 2020-04-08 DIAGNOSIS — Z79.899: ICD-10-CM

## 2020-04-08 DIAGNOSIS — Z87.891: ICD-10-CM

## 2020-04-08 DIAGNOSIS — M19.90: ICD-10-CM

## 2020-04-08 DIAGNOSIS — I10: ICD-10-CM

## 2020-04-08 DIAGNOSIS — F10.10: ICD-10-CM

## 2020-04-08 NOTE — NUR
Pain Clinic Assessment:
 
1. History of Osteoarthritis:
knees
HIPS
   History of Rheumatoid Arthritis:
DENIES
 
2. Height: 6 ft. 3 in. 190.5 cm.
   Weight: 259.8 lb.  oz. 117.845 kg.
   Patient's BMI: 32.5
 
3. Vital Signs:
   BP: 157/105 Pulse: 86 Resp: 14
   Temp:  02 Sat: 98 ECG Mon:
 
4. Pain Intensity: 5
 
5. Fall Risk:
   Dizziness: N  Needs help standing or walking: N
   Fallen in the last 3 months: N
   Fall risk comments:
 
 
6. Patient on Blood Thinner: None
 
7. History of Hypertension: Y
 
8. Opioid Therapy greater than 6 weeks: Y
   Opiate Contract Signed: 12/04/19
 
9. Risk Assessment Tool Provided: 1-LOW
 
10. Functional Assessment Tool: 62/70
 
11. Recreational Drug Use: Never Drug Type:
    Tobacco Use: Former Smoker Tobacco Type:
       Amount or Packs/day:  How Many Years:
    Alcohol Use: Yes  Frequency: Special Occasions Quant:

## 2020-04-15 NOTE — HPC
[facility]
[address]
[place]   [address]                     PAIN MANAGEMENT CONSULTATION  
_______________________________________________________________________________
 
Name:       TOMAS MICHELLE           [facility]                     [facility]      [id number]                       Account #:      [id number]  
Admission:  04/08/20    Attend Phys:    JANNA Lewis MD    
Discharge:              Date of Birth:  [birth year]  
                                                          Report #: [id number]
                                                                    [id number]   
_______________________________________________________________________________
THIS REPORT FOR:  
 
cc:  Juliocesar Alston,JANNA Woody MD                                            ~
CC: JANNA Alston
 
DATE OF SERVICE:  04/13/2020
 
 
CHIEF COMPLAINT:  Low back pain and hip pain.
 
HISTORY:  The patient is a 65-year-old gentleman who has been followed in the
pain clinic because of chronic pain.  He has had bilateral hip replacements.  He
used to be a .  He suffers from arthritic discomfort in the number of
joints.  Particularly has some pain in his right leg.  He described as sharp and
throbbing discomfort.  Walking, prolonged standing can be problematic.  He feels
his medications afford him some pain relief.  He has returned today with the
hopes of renewing his medications.  He is not having any complications from
their use.
 
ALLERGIES:  SULFA.
 
CURRENT MEDICATIONS:  OxyContin 20 mg b.i.d., oxycodone 10 mg b.i.d., Cymbalta
20 mg, Flonase, metoprolol 100 mg XL, Tylenol Extra Strength b.i.d.,
hydrochlorothiazide 25 mg.
 
PAIN CLINIC ASSESSMENT AND PQRS:
1.  The patient does have some osteoarthritic changes involving his knees and
his hips have been replaced.  Denies treatment for rheumatoid arthritis.
2.  Height 6 feet 3 inches, weight 259 pounds, BMI is 32.5.
3.  Vital Signs:  Blood pressure 157/105, pulse 86, respiratory rate 14, room
air saturation 98%.
4.  Pain intensity 5/10.
5.  Fall history:  The patient has not fallen in the last 3 months.
6.  Blood thinner.  He is not on a blood thinning medication, but does have an
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
7.  Hypertension.  The patient is being treated for hypertension.
8.  Opioids greater than 6 weeks.  The patient received medication from the pain
clinic.
9.  Risk assessment tool, low for opioid use.
10.  Functional assessment tool 62/70.
11.  Recreational drug use:  The patient denies.
12.  Tobacco:  The patient is a former smoker, does not smoke at this juncture.
13.  Alcohol:  The patient drinks alcoholic beverages rarely on [address]   [address]                     PAIN MANAGEMENT CONSULTATION  
_______________________________________________________________________________
 
Name:       TOMAS MICHELLE           Room #:                     REG [facility]#:      [id number]                       Account #:      [id number]  
Admission:  04/08/20    Attend Phys:    JANNA Lewis MD    
Discharge:              Date of Birth:  [birth year]  
                                                          Report #: [id number]
                                                                    [id number]   
_______________________________________________________________________________
occasions.
 
PHYSICAL EXAMINATION:
GENERAL:  The patient is a well-developed, well-nourished black male, appears
his stated age.  He is alert and oriented x 3.  His affect is appropriate. 
Speech is fluent.
HEENT:  Normocephalic, atraumatic.  Extraocular eye muscles intact.  Sclerae
nonicteric.  Mucous membranes are moist.
NECK:  Without adenopathy or JVD.
HEART:  Regular rate.
ABDOMEN:  Nontender.  Bowel sounds present.
EXTREMITIES:  Upper extremity muscle strength judged to be 5/5 for the major
muscle groups in the upper extremity.  The patient has pain and discomfort in
the lower extremities in the hips and knee area.  Walks with an antalgic gait. 
The patient without significant scoliosis, kyphosis or lordosis.
 
IMPRESSION:
1.  Chronic pain, status post back surgery.
2.  Pain in hip, status post right and left hip replacement.
3.  Depression.
4.  Hypercholesterolemia.
5.  Anxiety.
6.  Cardiovascular accident in the past.
7.  Complex medical management using opioids.
8.  Hypertension.
9.  Somewhat global arthritic pain in multiple joints.
 
RECOMMENDATIONS:  We discussed treatment options with the patient.  At this
juncture, we will continue with his medications.  The patient feels that his
medications continue to be helpful.  He is aware that they can become less
effective secondary to development of tolerance.  He is not showing signs of
addiction.  He has taken the medication as prescribed.  A script for his
medications of OxyContin 20 mg one p.o. b.i.d. has been rewritten.  The patient
will also continue with Percocet 10 mg 1 p.o. b.i.d.  He will take the
medication as prescribed.  He will call us if he has any concerns.
 
We would like to thank you for letting us participate in his care.  We hope he
continues to improve.  The patient has received a 2-month renewal of his
medications.
 
 
 
 
 
  <ELECTRONICALLY SIGNED>
   By: JANNA Lewis MD            
  04/15/20     1515
D: 04/13/20 2241                           _____________________________________
T: 04/13/20 2303                           JANNA Lewis MD              /BENJI

## 2020-06-17 ENCOUNTER — HOSPITAL ENCOUNTER (OUTPATIENT)
Dept: HOSPITAL 35 - PAIN | Age: 65
End: 2020-06-17
Attending: CLINICAL NURSE SPECIALIST
Payer: COMMERCIAL

## 2020-06-17 VITALS — HEIGHT: 75 IN | BODY MASS INDEX: 31.11 KG/M2 | WEIGHT: 250.2 LBS

## 2020-06-17 VITALS — SYSTOLIC BLOOD PRESSURE: 144 MMHG | DIASTOLIC BLOOD PRESSURE: 100 MMHG

## 2020-06-17 DIAGNOSIS — I10: ICD-10-CM

## 2020-06-17 DIAGNOSIS — M19.90: ICD-10-CM

## 2020-06-17 DIAGNOSIS — M54.5: Primary | ICD-10-CM

## 2020-06-17 DIAGNOSIS — M25.551: ICD-10-CM

## 2020-06-17 DIAGNOSIS — M25.552: ICD-10-CM

## 2020-06-17 DIAGNOSIS — F32.9: ICD-10-CM

## 2020-06-17 DIAGNOSIS — F11.20: ICD-10-CM

## 2020-06-17 DIAGNOSIS — Z79.899: ICD-10-CM

## 2020-06-17 DIAGNOSIS — G89.29: ICD-10-CM

## 2020-06-17 NOTE — NUR
Pain Clinic Assessment:
 
1. History of Osteoarthritis:
KNEES
HIPS
   History of Rheumatoid Arthritis:
DENIES
 
2. Height: 6 ft. 3 in. 190.5 cm.
   Weight: 250.2 lb.  oz. 113.490 kg.
   Patient's BMI: 31.3
 
3. Vital Signs:
   BP: 144/100 Pulse: 83 Resp: 16
   Temp:  02 Sat: 99 ECG Mon:
 
4. Pain Intensity: 5-6
 
5. Fall Risk:
   Dizziness: N  Needs help standing or walking: N
   Fallen in the last 3 months: N
   Fall risk comments:
 
 
6. Patient on Blood Thinner: None
 
7. History of Hypertension: Y
 
8. Opioid Therapy greater than 6 weeks: Y
   Opiate Contract Signed: 12/04/19
 
9. Risk Assessment Tool Provided: 1-LOW
 
10. Functional Assessment Tool: 62/70
 
11. Recreational Drug Use: Never Drug Type:
    Tobacco Use: Former Smoker Tobacco Type:
       Amount or Packs/day:  How Many Years:
    Alcohol Use: Yes  Frequency:  Quant:

## 2020-06-18 NOTE — HPC
[facility]
[address]
[place]   [address]                     PAIN MANAGEMENT CONSULTATION  
_______________________________________________________________________________
 
Name:       TOMSA MICHELLE           Room #:                     [facility].#:      [id number]                       Account #:      [id number]  
Admission:  06/17/20    Attend Phys:    Hallie Ram     
Discharge:              Date of Birth:  [birth year]  
                                                          Report #: [id number]
                                                                    [id number]   
_______________________________________________________________________________
THIS REPORT FOR:  
 
cc:  Juliocesar Alston Steven F. DO Hocker, Amanda CNS                                            ~
CC: ISA LEWIS MD
 
DATE OF SERVICE:  06/17/2020
 
 
CHIEF COMPLAINT:  Low back pain and hip pain.
 
HISTORY OF PRESENT ILLNESS:  This is a 65-year-old gentleman who is well known
to the pain clinic returning today for refill of his opioid medications.  Today,
he is reporting his pain score is a 5-6/10, stating his pain is mostly located
in his lower back and bilateral legs, though the right leg is worse.  He feels
that it is a sharp, throbbing pain, worse with any activity, standing at his
sink though he was able to cut the grass yesterday in the back and will cut the
front yard today.  He believes that the pain medicines are not working as
effectively as they had in the past.  He denies any problems with significant
constipation or daytime somnolence as a result of his medications.
 
ALLERGIES:  SULFA.
 
CURRENT LIST OF MEDICATIONS:  Oxycodone 10/325 b.i.d., OxyContin 20 mg b.i.d.,
Flonase, Flexeril, Toprol and hydrochlorothiazide.
 
PQRS:
1.  He has arthritic changes in his knees and hips.  Both have been replaced. 
He denies any rheumatoid arthritis.
2.  Height is 6 feet 3 inches.
3.  Weight is 250 and BMI is 31.
4.  Vital signs 144/100, pulse is 83, respirations 16, oxygen sat is 99.
5.  Pain score is 5-6.
6.  Denies dizziness, does not need help walking or standing, has not fallen in
the last 3 months.
7.  The patient is not on any blood thinners, but does take medicine for
hypertension.
8.  Opioid therapy is greater than 6 weeks; therefore, an opioid signed contract
is on the chart.  Risk assessment tool is low.  Functional assessment 62/70.
9.  Recreational drug use, he denies.  He is a former smoker and occasionally
drinks alcohol.
 
According to the prescription monitoring system, he is due to fill his
medications this week, filling them in a timely fashion.  His morphine mEq is 90
MME's according to the CDC guidelines.  We will check a random drug screen on
 
 
 
[place] [address]                     PAIN MANAGEMENT CONSULTATION  
_______________________________________________________________________________
 
Name:       TERRYJOSE DTOMASNESTOR PICKETT           Room #:                     REG [facility].#:      [id number]                       Account #:      [id number]  
Admission:  06/17/20    Attend Phys:    Hallie Ram     
Discharge:              Date of Birth:  [birth year]  
                                                          Report #: [id number]
                                                                    [id number]   
_______________________________________________________________________________
this patient at his next appointment.
 
PHYSICAL EXAMINATION:
GENERAL:  This is a well-developed, well-nourished white gentleman who appears
his stated age.  He is alert and orientated, placing his pain score a 5-6 today.
 Speech is fluent.
HEENT:  Normocephalic, atraumatic.  Extraocular eye muscles are intact.  Sclerae
are non-intrinsic.  He is wearing a mask today.
NECK:  Without adenopathy or JVD.
EXTREMITIES:  His upper and lower extremity strength judged to be 5/5 in all
major muscle groups.  He does have an antalgic gait.  He complains of pain in
his lower back that radiates into his bilateral legs.  He is without significant
scoliosis, kyphosis or lordosis.
 
IMPRESSION:
1.  Chronic pain, status post back surgery.
2.  Hip pain, status post bilateral hip replacements.
3.  Depression.
4.  Hypertension.
5.  Osteoarthritic changes in multiple joints.
6.  Complex medical management utilizing opioids.
 
We reviewed the fact that opiate medications are being used to provide analgesia
adequate to support activities of daily living, not attempting to achieve a
specific pain score on the 0-10 Visual Analog Scale.  The current opiate
medications are providing sufficient analgesia to allow the patient to
participate in activities of daily living.  The patient is not exhibiting any
aberrant behavior suggestive of drug diversion.  The patient is not having any
adverse reactions to medications.  The patient is not suffering from daytime
somnolence or mental acuity changes.  The patient is managing opiate-induced
constipation with appropriate over-the-counter agents and dietary
considerations.  The patient was counseled on concern for caution with operating
a motor vehicle while using opiate medications.
 
PLAN:
1.  We discussed treatment options with the patient today.  He feels that the
pain medication is not as effective as they had in the past filling his
long-acting dose last 8 hours, not 12.  We discussed possible opioid rotation. 
We will not be increasing his medication as he is at 90 morphine mEq according
to the CDC guidelines.  The patient verbalizes understanding.  We will have Dr. Dc Lewis.  Send medication refills for OxyContin 20 b.i.d., #60 as well as
OxyContin 10/325, #60 electronically to his pharmacy for today and 4-week
release.
2.  I did discuss the patient's weight gain in the past year, he has gained 30
pounds.  I explained to him, this could attribute some of his increased pain as
well.  I encouraged activity.  We discussed walking, riding a bike, body likes
 
 
 
[facility]
[address]
[place]   [address]                     PAIN MANAGEMENT CONSULTATION  
_______________________________________________________________________________
 
Name:       VIVIANATOMAS PICKETT           [facility]                     [id number]                       Account #:      [id number]  
Admission:  06/17/20    Attend Phys:    Hallie Ram     
Discharge:              Date of Birth:  [birth year]  
                                                          Report #: [id number]
                                                                    [id number]   
_______________________________________________________________________________
activity as well as helps release endorphins that makes him feel better.  The
patient verbalizes understanding.  He said that he was thinking about buying a
bike and will try to increase his activity.
3.  We did discuss diet as well.  The patient does drink soda and at times does
not eat as healthy as he would like because he cooks for himself.  I encouraged
him to try to make better choices and decrease his soda intake, the patient
states he will try to do this and decrease his weight over the next few months.
4.  The patient is seen in collaboration with Dr. Patricio Lewis.  We will
collect a random drug screen on him at his next visit as well.
 
 
 
 
 
 
 
 
 
 
 
 
 
 
 
 
 
 
 
 
 
 
 
 
 
 
 
 
 
 
 
 
 
 
 
  <ELECTRONICALLY SIGNED>
   By: Hallie Ram             
  06/18/20     0841
D: 06/17/20 1120                           _____________________________________
T: 06/17/20 1333                           Hallie Ram               /nt

## 2020-08-19 ENCOUNTER — HOSPITAL ENCOUNTER (OUTPATIENT)
Dept: HOSPITAL 35 - PAIN | Age: 65
End: 2020-08-19
Payer: COMMERCIAL

## 2020-08-19 VITALS — HEIGHT: 75 IN | BODY MASS INDEX: 30.59 KG/M2 | WEIGHT: 246 LBS

## 2020-08-19 VITALS — SYSTOLIC BLOOD PRESSURE: 144 MMHG | DIASTOLIC BLOOD PRESSURE: 89 MMHG

## 2020-08-19 DIAGNOSIS — F32.9: ICD-10-CM

## 2020-08-19 DIAGNOSIS — E78.00: ICD-10-CM

## 2020-08-19 DIAGNOSIS — F11.20: ICD-10-CM

## 2020-08-19 DIAGNOSIS — Z79.899: ICD-10-CM

## 2020-08-19 DIAGNOSIS — M19.90: ICD-10-CM

## 2020-08-19 DIAGNOSIS — F41.9: ICD-10-CM

## 2020-08-19 DIAGNOSIS — Z86.73: ICD-10-CM

## 2020-08-19 DIAGNOSIS — G89.29: Primary | ICD-10-CM

## 2020-08-19 DIAGNOSIS — M25.552: ICD-10-CM

## 2020-08-19 DIAGNOSIS — I10: ICD-10-CM

## 2020-08-19 DIAGNOSIS — M25.551: ICD-10-CM

## 2020-08-19 NOTE — NUR
Pain Clinic Assessment:
 
1. History of Osteoarthritis:
KNEES
HIPS
   History of Rheumatoid Arthritis:
DENIES
 
2. Height: 6 ft. 3 in. 190.5 cm.
   Weight: 246.0 lb.  oz. 111.585 kg.
   Patient's BMI: 30.7
 
3. Vital Signs:
   BP: 144/89 Pulse: 70 Resp: 18
   Temp:  02 Sat: 98 ECG Mon:
 
4. Pain Intensity: 5
 
5. Fall Risk:
   Dizziness: N  Needs help standing or walking: N
   Fallen in the last 3 months: N
   Fall risk comments:
 
 
6. Patient on Blood Thinner: None
 
7. History of Hypertension: Y
 
8. Opioid Therapy greater than 6 weeks: Y
   Opiate Contract Signed: 12/04/19
 
9. Risk Assessment Tool Provided: 1-LOW
 
10. Functional Assessment Tool: 62/70
 
11. Recreational Drug Use: Never Drug Type:
    Tobacco Use: Former Smoker Tobacco Type:
       Amount or Packs/day:  How Many Years:
    Alcohol Use: Yes  Frequency:  Quant:

## 2020-08-27 NOTE — HPC
[facility]
[address]
[place]   [address]                     PAIN MANAGEMENT CONSULTATION  
_______________________________________________________________________________
 
Name:       TOMAS MICHELLE           Room #:                     REG [facility].#:      [id number]                       Account #:      [id number]  
Admission:  08/19/20    Attend Phys:    JANNA Lewis MD    
Discharge:              Date of Birth:  [birth year]  
                                                          Report #: [id number]
                                                                    [id number]   
_______________________________________________________________________________
THIS REPORT FOR:  
 
cc:  Juliocesar Alston,JANNA Woody MD                                            ~
CC: Dr. Juliocesar Alston
 
DATE OF SERVICE:  08/19/2020
 
 
CHIEF COMPLAINT:  Here for medication renewal.
 
HISTORY:  The patient is a 65-year-old gentleman who has been followed in the
pain clinic.  He does have chronic pain.  He has had osteoarthritic changes in
his hips.  He has undergone bilateral hip replacements.  He was a . 
He suffers from arthritic discomfort in a number of joints.  He notes that
prolonged walking can still be problematic.  Feels his medications are helpful. 
He did not have any complications with them.  He has returned today for renewal
of his medications because of the chronic pain he is experiencing.
 
ALLERGIES:  SULFA.
 
CURRENT MEDICATIONS:  OxyContin 20 mg b.i.d., oxycodone 10 mg b.i.d., Cymbalta
20 mg, Flonase, metoprolol 100 mg XL, Tylenol Extra Strength b.i.d.,
hydrochlorothiazide 25 mg.
 
PAIN CLINIC ASSESSMENT AND PQRS:
1.  The patient does have some osteoarthritic changes involving his knees and
his hips have been replaced.
2.  The patient is not being treated for rheumatoid arthritis.
3.  Height 6 feet 3 inches, weight 246 pounds, BMI is 30.
4.  Vital Signs:  Blood pressure 144/89, pulse 70, respiratory rate 18, room air
saturation is 98%.
5.  Pain intensity, 5/10.
6.  Fall history.  The patient has not fallen in the last 3 months.
7.  Blood thinner.  The patient has an IVC filter in place.
8.  History of hypertension.
9.  Opioids greater than 6 weeks.  The patient received medication from the pain
clinic.
10.  Risk assessment tool, low for opioid use.
11.  Functional assessment tool, 62/70.
12.  Recreational drug use.  The patient denies.
13.  Tobacco.  The patient is a former smoker.
14.  Alcohol.  He rarely drinks alcoholic beverages.
 
 
 
[facility]
[address] [place]   [address]                     PAIN MANAGEMENT CONSULTATION  
_______________________________________________________________________________
 
Name:       TOMAS MICHELLE NIEVES           [facility] #:                     [facility]      [id number]                       Account #:      [id number]  
Admission:  08/19/20    Attend Phys:    JANNA Lewis MD    
Discharge:              Date of Birth:  [birth year]  
                                                          Report #: [id number]
                                                                    [id number]   
_______________________________________________________________________________
 
PHYSICAL EXAMINATION:
GENERAL:  The patient is a well-developed, well-nourished, black male.  Appears
his stated age.  He is alert and oriented x 3.  His affect is appropriate. 
Speech is fluent.
HEENT:  Normocephalic, atraumatic.  Extraocular eye muscles intact.  Sclerae
nonicteric.  Mucous membranes are moist.  The patient is wearing a face cover.
NECK:  Without adenopathy or JVD.
HEART:  Regular rate.
ABDOMEN:  Nontender.  Bowel sounds present.
EXTREMITIES:  Upper extremity muscle strength judged to be 5/5 for the major
muscle groups in the upper extremity.  The patient has some pain and discomfort
in lower portion of his back.  Has some decreased range of motion because of
previous hip surgeries.  Complains of some pain in the knees.  Walks with an
antalgic gait.  The patient is without significant scoliosis, kyphosis or
lordosis.
 
IMPRESSION:
1.  Chronic pain, status post back surgery.
2.  Pain in the hip, status post right and left hip replacement.
3.  Depression.
4.  Hypercholesterolemia.
5.  Anxiety.
6.  Cardiovascular accident in the past.
7.  Complex medical management using opioids.
8.  Hypertension.
9.  Arthritis in numerous joints.
 
RECOMMENDATIONS:  We will continue with the patient's current pain medications
by mouth.  He is aware that these medications can become less effective.  He is
taking the medications as prescribed.  He does not show any signs of addiction. 
He feels that the OxyContin as well as the oxycodone medications continue to be
beneficial and enable him to engage in activities would be less likely to
participate in without their use.  Keeps his medications in a guarded area.  He
will call us if he has any problems with his medications.
 
We would like to thank you for letting us participate in his care.  We hope he
continues to improve.
 
 
 
 
 
 
  <ELECTRONICALLY SIGNED>
   By: JANNA Lewis MD            
  08/27/20     1325
D: 08/26/20 1626                           _____________________________________
T: 08/26/20 2244                           JANNA Lewis MD              /BENJI

## 2020-10-07 ENCOUNTER — HOSPITAL ENCOUNTER (OUTPATIENT)
Dept: HOSPITAL 35 - PAIN | Age: 65
End: 2020-10-07
Payer: COMMERCIAL

## 2020-10-07 VITALS — SYSTOLIC BLOOD PRESSURE: 151 MMHG | DIASTOLIC BLOOD PRESSURE: 93 MMHG

## 2020-10-07 VITALS — BODY MASS INDEX: 30.21 KG/M2 | HEIGHT: 75 IN | WEIGHT: 243 LBS

## 2020-10-07 DIAGNOSIS — F41.8: ICD-10-CM

## 2020-10-07 DIAGNOSIS — M54.5: Primary | ICD-10-CM

## 2020-10-07 DIAGNOSIS — E78.00: ICD-10-CM

## 2020-10-07 DIAGNOSIS — M19.90: ICD-10-CM

## 2020-10-07 DIAGNOSIS — Z88.8: ICD-10-CM

## 2020-10-07 DIAGNOSIS — Z79.899: ICD-10-CM

## 2020-10-07 DIAGNOSIS — F11.20: ICD-10-CM

## 2020-10-07 DIAGNOSIS — Z87.39: ICD-10-CM

## 2020-10-07 DIAGNOSIS — I10: ICD-10-CM

## 2020-10-07 DIAGNOSIS — Z86.73: ICD-10-CM

## 2020-10-07 NOTE — NUR
Pain Clinic Assessment:
 
1. History of Osteoarthritis:
KNEES
HIPS
   History of Rheumatoid Arthritis:
DENIES
 
2. Height: 6 ft. 3 in. 190.5 cm.
   Weight: 243.0 lb.  oz. 110.224 kg.
   Patient's BMI: 30.4
 
3. Vital Signs:
   BP: 151/93 Pulse: 97 Resp: 18
   Temp:  02 Sat: 98 ECG Mon:
 
4. Pain Intensity: 8
 
5. Fall Risk:
   Dizziness: N  Needs help standing or walking: N
   Fallen in the last 3 months: N
   Fall risk comments:
 
 
6. Patient on Blood Thinner: None
 
7. History of Hypertension: Y
 
8. Opioid Therapy greater than 6 weeks: Y
   Opiate Contract Signed: 12/04/19
 
9. Risk Assessment Tool Provided: 1-LOW
 
10. Functional Assessment Tool: 62/70
 
11. Recreational Drug Use: Never Drug Type:
    Tobacco Use: Former Smoker Tobacco Type:
       Amount or Packs/day:  How Many Years:
    Alcohol Use: Yes  Frequency:  Quant:

## 2020-10-23 NOTE — HPC
[facility]
[address]
[place]   [address]                     PAIN MANAGEMENT CONSULTATION  
_______________________________________________________________________________
 
Name:       TOMAS MICHELLE           Room #:                     [facility].#:      [id number]                       Account #:      [id number]  
Admission:  10/07/20    Attend Phys:    JANNA Lewis MD    
Discharge:              Date of Birth:  [birth year]  
                                                          Report #: [id number]
                                                                    [id number]   
_______________________________________________________________________________
CC: JANNA Alston
 
DATE OF SERVICE:  10/07/2020
 
 
CHIEF COMPLAINT:  "Low back pain has intensified and wakes me at night."
 
HISTORY:  The patient is a 65-year-old gentleman who has been followed in the
pain clinic because of chronic back pain.  He has had hip surgeries.  He has had
hip replacements bilaterally.  As you may recall, he was a .  He
continues to suffer from arthritic discomfort in a number of areas, shoulders,
back.  The patient has returned today indicating that his back pain has
increased.  He has noticed that the pain has intensified.  It has been awakening
him from sleep.  He occasionally walks with a cane.  He feels that his
medications are helpful.  He has an impacted molar.  He feels that this may be
infected.  He is going to follow up with his physician regarding treatment
course with antibiotics.
 
ALLERGIES:  SULFA.
 
CURRENT MEDICATIONS:  OxyContin 20 mg b.i.d., oxycodone 10 mg b.i.d., Cymbalta
20 mg, Flonase, metoprolol 100 mg XL, Tylenol Extra Strength b.i.d.,
hydrochlorothiazide 25 mg.
 
PAIN CLINIC ASSESSMENT AND PQRS:
1.  The patient does have some osteoarthritic changes involving his knees and
his hips have been replaced.
2.  The patient is not being treated for rheumatoid arthritis.
3.  Height 6 feet 3 inches, weight 243 pounds, BMI is 30.
4.  Vital Signs:  Blood pressure 151/93, pulse 97, respiratory rate 18, room air
saturation 98%.
5.  Pain intensity 8/10 with pain in his jaw because of an impacted molar.
6.  Fall risk.  The patient has not fallen in the last 3 months and does walk on
occasion with a cane.
7.  Blood thinner.  The patient is not on a blood thinning medication.
8.  Hypertension.  The patient is being treated for hypertension.
9.  Opioids greater than 6 weeks.  The patient receives medication from the pain
clinic.
10.  Risk assessment tool, low for opioid use.
11.  Functional assessment tool 62/70.
12.  Recreational drug use.  The patient denies.
13.  Tobacco:  The patient is a former smoker.
14.  Alcohol.  The patient occasionally drinks alcoholic beverages on rare
occasion.
 
 
PHYSICAL EXAMINATION:
GENERAL:  The patient is a well-developed, well-nourished black male, appears
his stated age.  He is alert and oriented x 3.  His affect is appropriate. 
Speech is fluent.
HEENT:  Normocephalic, atraumatic.  Extraocular eye muscles are intact.  The
patient has an impacted molar with complaint of pain in his jaw.  He is wearing
a facial covering.
NECK:  Without adenopathy or JVD.
HEART:  Regular rate.
LUNGS:  Clear.
ABDOMEN:  Nontender.  Bowel sounds are present.
EXTREMITIES:  Upper extremity muscle strength judged to be 5-/5 for the major
muscle groups in the upper extremity.  The patient has some discomfort in the
lower portion of his back.  He has decreased range of motion because of previous
hip surgeries.  He has pain down his knees.  Also, he walks with an antalgic
gait.  A well-healed scar in the low back area.  He has had 2 back surgeries.
 
IMPRESSION:
1.  Chronic pain, status post 2 back surgeries.
2.  History of pain in the hips, status post right and left hip replacement.
3.  Depression.
4.  Hypercholesterolemia.
5.  Anxiety.
6.  Cardiovascular accident in the past.
7.  Complex medical management using opioids.
8.  Hypertension.
9.  Arthritis in numerous joints.
 
RECOMMENDATIONS:  We discussed treatment options with the patient.  At this
juncture, we will continue with his medications.  A script for his medications
of oxycodone 20 mg has been provided for the next 2 months.  We will also
continue with Percocet 10 mg 1 p.o. b.i.d.  The patient will contact his
dentist/primary care physician regarding the impacted molar.  We explained to
the patient the problems that could be associated with an infected tooth.  They
include cardiac infection, problems which could be quite problematic long term. 
He states that he will follow up in that regard.
 
We would like to thank you for letting us participate in his care.  We hope he
continues to improve.
 
 
 
 
 
 
 
 
 
 
 
 
 
 
 
 
 
 
 
  <ELECTRONICALLY SIGNED>
   By: JANNA Lewis MD            
  10/23/20     0811
D: 10/21/20 0827                           _____________________________________
T: 10/21/20 1253                           JANNA Lewis MD              /nt

## 2020-12-09 ENCOUNTER — HOSPITAL ENCOUNTER (OUTPATIENT)
Dept: HOSPITAL 35 - PAIN | Age: 65
End: 2020-12-09
Attending: CLINICAL NURSE SPECIALIST
Payer: COMMERCIAL

## 2020-12-09 VITALS — HEIGHT: 75 IN | WEIGHT: 247.8 LBS | BODY MASS INDEX: 30.81 KG/M2

## 2020-12-09 VITALS — SYSTOLIC BLOOD PRESSURE: 136 MMHG | DIASTOLIC BLOOD PRESSURE: 95 MMHG

## 2020-12-09 DIAGNOSIS — I10: ICD-10-CM

## 2020-12-09 DIAGNOSIS — Z96.643: ICD-10-CM

## 2020-12-09 DIAGNOSIS — Z79.891: ICD-10-CM

## 2020-12-09 DIAGNOSIS — M19.90: ICD-10-CM

## 2020-12-09 DIAGNOSIS — F32.9: ICD-10-CM

## 2020-12-09 DIAGNOSIS — F41.9: ICD-10-CM

## 2020-12-09 DIAGNOSIS — G89.29: Primary | ICD-10-CM

## 2020-12-09 NOTE — NUR
Pain Clinic Assessment:
 
1. History of Osteoarthritis:
KNEES
HIPS
   History of Rheumatoid Arthritis:
DENIES
 
2. Height: 6 ft. 3 in. 190.5 cm.
   Weight: 247.8 lb.  oz. 112.402 kg.
   Patient's BMI: 31.0
 
3. Vital Signs:
   BP: 136/95 Pulse: 106 Resp: 16
   Temp:  02 Sat: 100 ECG Mon:
 
4. Pain Intensity: 8
 
5. Fall Risk:
   Dizziness: N  Needs help standing or walking: N
   Fallen in the last 3 months: N
   Fall risk comments:
 
 
6. Patient on Blood Thinner: None
 
7. History of Hypertension: Y
 
8. Opioid Therapy greater than 6 weeks: Y
   Opiate Contract Signed: 12/04/19
 
9. Risk Assessment Tool Provided: 1-LOW
 
10. Functional Assessment Tool: 62/70
 
11. Recreational Drug Use: Never Drug Type:
    Tobacco Use: Former Smoker Tobacco Type:
       Amount or Packs/day:  How Many Years:
    Alcohol Use: No  Frequency:  Quant:

## 2020-12-10 NOTE — HPC
[facility]
[address]
[place]   [address]                     PAIN MANAGEMENT CONSULTATION  
_______________________________________________________________________________
 
Name:       TOMAS MICHELLE           Room #:                     [facility]      [id number]                       Account #:      [id number]  
Admission:  12/09/20    Attend Phys:    Hallie Ram     
Discharge:              Date of Birth:  [birth year]  
                                                          Report #: [id number]
                                                                    [id number]   
_______________________________________________________________________________
THIS REPORT FOR:  
 
cc:  Juliocesar Alston Steven F. DO Hocker,Hallie Mcgarry
DATE OF SERVICE:  12/09/2020
 
 
CHIEF COMPLAINT:  Low back pain with lumbar radiculopathy.
 
HISTORY OF PRESENT ILLNESS:  This is a 65-year-old gentleman who returns to the
pain clinic today in obvious withdrawal symptoms.  He reports he did not make a
timely appointment for his medication refills and today he is quite nauseated. 
He has been vomiting at home.  His blood pressure and pulse are elevated.  He
reports he has not had his long-acting opioid medication for several days,
though he has had a few of his breakthrough pain medicines.
 
The patient does report a pain score of 8/10 today.  Pain located in his low
back that radiates down his bilateral legs on the outer aspect.  It is a sharp,
throbbing pain, worse with any activity and walking as well as standing.  He
said the only relief he has is when he is lying down and using his medications. 
The patient is wondering if he should return to his surgeon for his low back
pain that is increasing.
 
ALLERGIES:  SULFA.
 
CURRENT MEDICATIONS:  Senokot, oxycodone 10/325, OxyContin 20 mg b.i.d.,
aspirin, lisinopril, Flonase, Flexeril, Toprol, BenGay, Tylenol Extra Strength,
hydrochlorothiazide.
 
PQRS:
1.  He has history of osteoarthritis in his knees and hips.  Denies any
rheumatoid arthritis.
2.  Height is 6 feet 3 inches, weight is 247, BMI is 31.  Vital signs 136/95,
pulse is 106, respirations 16, oxygen sat is 100, pain score is 8/10. Fall risk.
 Denies dizziness, does not need help walking or standing, has not fallen in the
last 3 months.  The patient is not on any blood thinners, but does take medicine
for hypertension.  His opioid therapy is greater than 6 weeks; therefore, an
opioid signed contract is on the chart.  Risk assessment is low.  Functional
assessment is 62/70.
3.  Recreational drug use, he denies.  He is a former smoker and does not drink
alcohol.
 
According to the prescription monitoring system, the patient filled his
medications on 11/2018 for his short acting but his long acting was filled on
the 4th, therefore he should have been out last week.  Morphine mEq is 80 MME
 
 
 
[address]   [address]                     PAIN MANAGEMENT CONSULTATION  
_______________________________________________________________________________
 
Name:       TOMAS MICHELLE           Room #:                     REG MELECIO LEE#:      [id number]                       Account #:      [id number]  
Admission:  12/09/20    Attend Phys:    Hallie Ram     
Discharge:              Date of Birth:  [birth year]  
                                                          Report #: [id number]
                                                                    [id number]   
_______________________________________________________________________________
per day and there is a recent drug screen on the chart that is appropriate for
his medications.
 
PHYSICAL EXAMINATION:
GENERAL:  This is alert and orientated, well-developed, well-nourished white
gentleman who appears his stated age, placing his pain score at 8/10 today.  His
affect is appropriate and speech is fluent.  He is alert and orientated.
HEENT:  Normocephalic, atraumatic.  Extraocular eye muscles are intact.  He is
wearing a mask.
NECK:  Without adenopathy or JVD.
EXTREMITIES:  Upper extremity strength is symmetrical at 5/5, pain  in
his lower back that radiates down his bilateral legs on the outer aspect of his
thighs following the L5-S1 dermatomal distribution.  His lower extremity
strength is symmetrical at 5/5.  He does have decreased range of motion from
previous hip surgery bilaterally and has an antalgic gait.
 
IMPRESSION:
1.  Chronic pain, status post back surgeries.
2.  History of pain, bilateral hips with status post replacement.
3.  Depression.
4.  Anxiety.
5.  Complex medical management utilizing opioids.
6.  Hypertension.
7.  Osteoarthritis.
 
We reviewed the fact that opiate medications are being used to provide analgesia
adequate to support activities of daily living, not attempting to achieve a
specific pain score on the 0-10 Visual Analog Scale.  The current opiate
medications are providing sufficient analgesia to allow the patient to
participate in activities of daily living.  The patient is not exhibiting any
aberrant behavior suggestive of drug diversion.  The patient is not having any
adverse reactions to medications.  The patient is not suffering from daytime
somnolence or mental acuity changes.  The patient is managing opiate-induced
constipation with appropriate over-the-counter agents and dietary
considerations.  The patient was counseled on concern for caution with operating
a motor vehicle while using opiate medications.
 
PLAN:
1.  We discussed treatment options with the patient today.  The patient reminded
to call for a timely appointment, so he does not go through withdrawal.  This is
the second instance that he has come in withdrawal due to being out of his
medications due to the fact that he did not make a timely appointment.  Scripts
will be provided for his Percocet 10/325 as well as OxyContin 20 mg.  These will
be sent electronically by Dr. Lewis.
2.  The patient has been having increasing low back pain that has been radiating
down his bilateral legs to his knees, occasionally to his ankles following the
 
 
 
[facility]
[address] [place]   [address]                     PAIN MANAGEMENT CONSULTATION  
_______________________________________________________________________________
 
Name:       TERRYJOSE DTOMAS NIEVES           Room #:                     REG MELECIO CATHERINE.#:      [id number]                       Account #:      [id number]  
Admission:  12/09/20    Attend Phys:    Hallie Ram     
Discharge:              Date of Birth:  [birth year]  
                                                          Report #: [id number]
                                                                    [id number]   
_______________________________________________________________________________
L5-S1 dermatomal distribution.  I think it may be beneficial for him to have a
lumbar epidural steroid injection by Dr. Lewis.  We will schedule this for the
patient for next week.  If he does not find benefit from this epidural, we may
have him return to his neurosurgeon.  Appointment made prior to discharge.
3.  The patient is seen today in collaboration with Dr. Lewis.
 
 
 
 
 
 
 
 
 
 
 
 
 
 
 
 
 
 
 
 
 
 
 
 
 
 
 
 
 
 
 
 
 
 
 
 
 
 
 
  <ELECTRONICALLY SIGNED>
   By: Hallie Ram             
  12/10/20     0827
D: 12/09/20 1545                           _____________________________________
T: 12/09/20 2032                           Hallie Ram               /nt

## 2021-02-12 ENCOUNTER — HOSPITAL ENCOUNTER (OUTPATIENT)
Dept: HOSPITAL 35 - PAIN | Age: 66
End: 2021-02-12
Attending: CLINICAL NURSE SPECIALIST
Payer: COMMERCIAL

## 2021-02-12 VITALS — SYSTOLIC BLOOD PRESSURE: 138 MMHG | DIASTOLIC BLOOD PRESSURE: 88 MMHG

## 2021-02-12 VITALS — HEIGHT: 75 IN | BODY MASS INDEX: 30.54 KG/M2 | WEIGHT: 245.6 LBS

## 2021-02-12 DIAGNOSIS — M54.16: ICD-10-CM

## 2021-02-12 DIAGNOSIS — G89.29: Primary | ICD-10-CM

## 2021-02-12 DIAGNOSIS — M19.90: ICD-10-CM

## 2021-02-12 DIAGNOSIS — Z96.643: ICD-10-CM

## 2021-02-12 DIAGNOSIS — F41.9: ICD-10-CM

## 2021-02-12 DIAGNOSIS — F32.9: ICD-10-CM

## 2021-02-12 DIAGNOSIS — Z79.891: ICD-10-CM

## 2021-02-12 NOTE — NUR
Pain Clinic Assessment:
 
1. History of Osteoarthritis:
KNEES
HIPS
   History of Rheumatoid Arthritis:
DENIES
 
2. Height: 6 ft. 3 in. 190.5 cm.
   Weight: 245.6 lb.  oz. 111.404 kg.
   Patient's BMI: 30.7
 
3. Vital Signs:
   BP: 138/88 Pulse: 79 Resp: 18
   Temp:  02 Sat: 97 ECG Mon:
 
4. Pain Intensity: 5
 
5. Fall Risk:
   Dizziness: N  Needs help standing or walking: N
   Fallen in the last 3 months: N
   Fall risk comments:
 
 
6. Patient on Blood Thinner: None
 
7. History of Hypertension: Y
 
8. Opioid Therapy greater than 6 weeks: Y
   Opiate Contract Signed: 12/04/19
 
9. Risk Assessment Tool Provided: 1-LOW
 
10. Functional Assessment Tool: 62/70
 
11. Recreational Drug Use: Never Drug Type:
    Tobacco Use: Former Smoker Tobacco Type:
       Amount or Packs/day:  How Many Years:
    Alcohol Use: No  Frequency:  Quant:

## 2021-03-12 ENCOUNTER — HOSPITAL ENCOUNTER (OUTPATIENT)
Dept: HOSPITAL 35 - PAIN | Age: 66
End: 2021-03-12
Attending: CLINICAL NURSE SPECIALIST
Payer: COMMERCIAL

## 2021-03-12 VITALS — HEIGHT: 75 IN | WEIGHT: 246 LBS | BODY MASS INDEX: 30.59 KG/M2

## 2021-03-12 VITALS — DIASTOLIC BLOOD PRESSURE: 98 MMHG | SYSTOLIC BLOOD PRESSURE: 129 MMHG

## 2021-03-12 DIAGNOSIS — M19.90: ICD-10-CM

## 2021-03-12 DIAGNOSIS — M25.551: ICD-10-CM

## 2021-03-12 DIAGNOSIS — K04.7: ICD-10-CM

## 2021-03-12 DIAGNOSIS — M25.552: ICD-10-CM

## 2021-03-12 DIAGNOSIS — F41.8: ICD-10-CM

## 2021-03-12 DIAGNOSIS — Z88.8: ICD-10-CM

## 2021-03-12 DIAGNOSIS — M54.16: Primary | ICD-10-CM

## 2021-03-12 DIAGNOSIS — G89.29: ICD-10-CM

## 2021-03-12 DIAGNOSIS — F11.20: ICD-10-CM

## 2021-03-12 DIAGNOSIS — Z79.899: ICD-10-CM

## 2021-03-12 NOTE — NUR
Pain Clinic Assessment:
 
1. History of Osteoarthritis:
KNEES
HIPS
back
   History of Rheumatoid Arthritis:
DENIES
 
2. Height: 6 ft. 3 in. 190.5 cm.
   Weight: 246.0 lb.  oz. 111.585 kg.
   Patient's BMI: 30.7
 
3. Vital Signs:
   BP: 129/98 Pulse: 92 Resp: 14
   Temp:  02 Sat: 98 ECG Mon:
 
4. Pain Intensity: 8
 
5. Fall Risk:
   Dizziness: N  Needs help standing or walking: N
   Fallen in the last 3 months: N
   Fall risk comments:
 
 
6. Patient on Blood Thinner: None
 
7. History of Hypertension: Y
 
8. Opioid Therapy greater than 6 weeks: Y
   Opiate Contract Signed: 12/04/19
 
9. Risk Assessment Tool Provided: 1-LOW
 
10. Functional Assessment Tool: 62/70
 
11. Recreational Drug Use: Never Drug Type:
    Tobacco Use: Former Smoker Tobacco Type:
       Amount or Packs/day:  How Many Years:
    Alcohol Use: No  Frequency:  Quant:

## 2021-03-15 NOTE — HPC
[facility]
[address]
[place]   [address]                     PAIN MANAGEMENT CONSULTATION  
_______________________________________________________________________________
 
Name:       TOMAS MICHELLE           Room #:                     REG [facility]..#:      [id number]                       Account #:      [id number]  
Admission:  03/12/21    Attend Phys:    Hallie Ram     
Discharge:              Date of Birth:  [birth year]  
                                                          Report #: [id number]
                                                                    [id number]   
_______________________________________________________________________________
THIS REPORT FOR:  
 
cc:  Juliocesar Alston Steven F. DO Hocker,Hallie Mcgarry
DATE OF SERVICE:  03/12/2021
 
 
CHIEF COMPLAINT:  Chronic low back pain with lumbar radiculopathy and hip pain.
 
HISTORY OF PRESENT ILLNESS:  This is a 65-year-old gentleman who returns to the
pain clinic today for renewal of his medications.  At his last visit, we did an
opioid rotation from OxyContin to morphine.  This was at the request of his
insurance company that there were no longer paying for his OxyContin.  The
patient has filled some end of dose pain with his morphine sulfate.  He states
it is not as beneficial as his OxyContin was.  Today, the patient complains of
pain of 8/10, most significantly in his low back, but does radiate into his hips
and legs.  He characterizes it as a sharp, throbbing sensation.  It is worse
with activity, walking and prolonged standing.  Medications and resting and
lying down are usually beneficial.  He reports utilizing his short-acting
medications more since he has been on the morphine.
 
The patient also reports that he has an abscess in his mouth.  He has been on
antibiotics for the last 2 weeks.  He is scheduled to see his dentist in a
week's time and then have his tooth removed.  He believes this may be causing
some of his increased pain as well.  I believe he would like to continue on his
current dose of medicine post-infection to see if he does have better pain
control.
 
We have started him on amitriptyline and he feels that is not effective in
helping him with sleep or any radicular symptoms he is having at night.  He is
having some increased constipation since he has changed his medication.
 
ALLERGIES:  SULFA.
 
CURRENT LIST OF MEDICATIONS:  Amitriptyline 10 mg at bedtime, Percocet 10/325
p.r.n., MS Contin 30 mg b.i.d., Senokot, aspirin, lisinopril, Flexeril,
metoprolol, BenGay, hydrochlorothiazide.
 
PQRS:
1.  He has osteoarthritic changes in his hips, knees and back.  Denies any
rheumatoid arthritis.
2.  Height is 6 feet 3 inches, weight is 246, BMI is 30.  Vital signs 129/98,
pulse is 92, respirations 14, oxygen sat is 98%.
3.  Pain score is 8/10.
4.  Denies dizziness, does not need help walking or standing, has not fallen in
 
 
 
[facility]
[address] [place] [address]                     PAIN MANAGEMENT CONSULTATION  
_______________________________________________________________________________
 
Name:       VIVIANATOMAS NIEVES           Room #:                     REG MELECIO CATHERINE.#:      [id number]                       Account #:      [id number]  
Admission:  03/12/21    Attend Phys:    Hallie Ram     
Discharge:              Date of Birth:  [birth year]  
                                                          Report #: [id number]
                                                                    [id number]   
_______________________________________________________________________________
the last 3 months.
5.  The patient is not on any blood thinners, but does take medicine for
hypertension.  His opioid therapy is greater than 6 weeks; therefore, an opioid
signed contract is on the chart.  Risk assessment is low.  Functional assessment
62/70.
6.  Recreational drug use, he denies.  He is a former smoker and does not drink
alcohol.
 
According to the prescription monitoring system, he is filling appropriately. 
He is due to fill his medications today.  His morphine milliequivalent is 90
MME's.  There is a drug screen on the chart that is appropriate for his
medications.
 
PHYSICAL EXAMINATION:
GENERAL:  This is alert and orientated, well-developed, well-nourished, black
gentleman who appears his stated age, stating his pain of 8/10 today.
HEENT:  Normocephalic, atraumatic.  Extraocular eye muscles are intact.  Mucous
membranes are moist.  He is wearing a mask.  Tenderness in his right jaw with no
edema noted in his face.
NECK:  Without adenopathy or JVD.
MUSCULOSKELETAL:  Discomfort in his lumbosacral region that radiates down the
L5-S1 dermatomal distribution to his feet.  He has an antalgic gait.  His upper
and lower extremity strength is symmetrical at 5/5.
 
IMPRESSION:
1.  Chronic pain, status post back surgeries.
2.  Bilateral hip pain, status post replacements.
3.  Depression.
4.  Anxiety.
5.  Osteoarthritis affecting multiple joints.
6.  Tooth abscess on current antibiotic therapy.
7.  Complex medical management utilizing scheduled opioid medications.
 
We reviewed the fact that opiate medications are being used to provide analgesia
adequate to support activities of daily living, not attempting to achieve a
specific pain score on the 0-10 Visual Analog Scale.  The current opiate
medications are providing sufficient analgesia to allow the patient to
participate in activities of daily living.  The patient is not exhibiting any
aberrant behavior suggestive of drug diversion.  The patient is not having any
adverse reactions to medications.  The patient is not suffering from daytime
somnolence or mental acuity changes.  The patient is managing opiate-induced
constipation with appropriate over-the-counter agents and dietary
considerations.  The patient was counseled on concern for caution with operating
a motor vehicle while using opiate medications.
 
A physical exam was performed and the patient's functional status was evaluated.
 
 
 
[address] [place]   [address]                     PAIN MANAGEMENT CONSULTATION  
_______________________________________________________________________________
 
Name:       TOMAS MICHELLE           Room #:                     REG [facility] 
M.R.#:      [id number]                       Account #:      [id number]  
Admission:  03/12/21    Attend Phys:    Hallie DENNY Ram     
Discharge:              Date of Birth:  [birth year]  
                                                          Report #: [id number]
                                                                    [id number]   
_______________________________________________________________________________
 All patients with back pain were advised against the bed rest greater than 4
days and were advised to return to normal activities.  Pain score assessment was
noted and the treatment plan was reviewed with the patient.  All current
medications, both prescribed and OTC were reviewed and reconciled on the
electronic medical record.  Tobacco screening was accomplished and smoking
cessation was advised when indicated.  BMI was noted and diet/exercise
modification was recommended for all patients following outside normal
parameters.
 
I reviewed with the patient today their responsibilities to safeguard
prescription medications, reviewed their responsibility to utilize medications
only as prescribed by the physician.  They are to seek and receive pain
medications only from 1 physician group ( [facility]).  They are to use 1
pharmacy and keep the clinic informed if they change pharmacies.  Their
responsibilities include making followup visits in a timely fashion and to avoid
abrupt discontinuation of medication usage.  Their responsibilities further
include bringing their medications (bottles from the pharmacy with residual
pills) to the visit for possible confirmation of pill counts and the patient
understands it is their responsibility to submit to random drug screens to
ensure both that the medications prescribed are present, and that no other
controlled substances are present.  All prescriptions provided today were
generated electronically.
 
PLAN:
1.  We discussed treatment options with the patient today.  I believe that we
will continue his MS Contin 30 mg twice a day for another 2 months to see if his
pain does resolve after his tooth is taking care of next week and have his body
continue to adjust to his current medications.  The patient is agreeable with
this plan of care.  If in 2 months, his pain is not stabilize, we may consider a
t.i.d. dosing of MS Contin, but it would be at a lower dose to keep him at the
same morphine mEq.
2.  Scripts sent by Dr. Lewis for his MS Contin 30 mg b.i.d. to fill today and
again in 4 weeks as well as his oxycodone 10/325, #60.
3.  We did discuss his amitriptyline.  He has been having difficulty sleeping. 
We tried at the very lowest dose of amitriptyline, encouraged him not to take 2
at bedtime for 1 week, then if he is not seeing any results to increase it to 3
tablets.  Scripts sent for #90 with one additional refill.  We did remind the
patient side effects of this medication as well as side effects of constipation
from his change of opioid medication.  Hopefully, those will resolve the longer
he is on the medications.  The patient is seen today in collaboration with Dr. Patricio Lewis, he will return in 2 months.
 
Time spent with the patient in consultation, reviewing studies, clinical notes,
physician reports, physical exam, and correlation of medical findings to
determine treatments of 15 minutes.
 
 
 
 
[address]   [address]                     PAIN MANAGEMENT CONSULTATION  
_______________________________________________________________________________
 
Name:       TOMAS MICHELLE           Room #:                     REG MELECIO LEE#:      [id number]                       Account #:      [id number]  
Admission:  03/12/21    Attend Phys:    Hallie Ram     
Discharge:              Date of Birth:  [birth year]  
                                                          Report #: [id number]
                                                                    [id number]   
_______________________________________________________________________________
Time spent with preparation for appointment reviewing prescription monitoring
system, reviewing previous records and proposed treatment options and reviewing
current medications 5 minutes.
 
Time spent in preparation and sending electronic prescriptions with collaborated
physician of Dr. Lewis and documentation visit and plan of treatment 8 minutes.
 
Total time 28 minutes.
 
 
 
 
 
 
 
 
 
 
 
 
 
 
 
 
 
 
 
 
 
 
 
 
 
 
 
 
 
 
 
 
 
 
 
 
  <ELECTRONICALLY SIGNED>
   By: Hallie Ram             
  03/15/21     0758
D: 03/12/21 1130                           _____________________________________
T: 03/12/21 1235                           Hallie Ram               /nt

## 2021-04-06 ENCOUNTER — HOSPITAL ENCOUNTER (EMERGENCY)
Dept: HOSPITAL 35 - ER | Age: 66
Discharge: HOME | End: 2021-04-06
Payer: COMMERCIAL

## 2021-04-06 VITALS — HEIGHT: 75 IN | BODY MASS INDEX: 29.84 KG/M2 | WEIGHT: 240 LBS

## 2021-04-06 VITALS — SYSTOLIC BLOOD PRESSURE: 145 MMHG | DIASTOLIC BLOOD PRESSURE: 106 MMHG

## 2021-04-06 DIAGNOSIS — Z79.899: ICD-10-CM

## 2021-04-06 DIAGNOSIS — Z79.82: ICD-10-CM

## 2021-04-06 DIAGNOSIS — Z96.643: ICD-10-CM
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
## 2021-04-06 DIAGNOSIS — S70.01XA: Primary | ICD-10-CM

## 2021-04-06 DIAGNOSIS — I10: ICD-10-CM

## 2021-04-06 DIAGNOSIS — Z88.2: ICD-10-CM

## 2021-04-06 DIAGNOSIS — M25.511: ICD-10-CM

## 2021-04-06 DIAGNOSIS — Z98.890: ICD-10-CM
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
## 2021-05-14 ENCOUNTER — HOSPITAL ENCOUNTER (OUTPATIENT)
Dept: HOSPITAL 35 - PAIN | Age: 66
End: 2021-05-14
Payer: COMMERCIAL

## 2021-05-14 VITALS — DIASTOLIC BLOOD PRESSURE: 76 MMHG | SYSTOLIC BLOOD PRESSURE: 133 MMHG

## 2021-05-14 VITALS — HEIGHT: 75 IN | BODY MASS INDEX: 30.34 KG/M2 | WEIGHT: 244 LBS

## 2021-05-14 DIAGNOSIS — Z79.899: ICD-10-CM

## 2021-05-14 DIAGNOSIS — M19.90: ICD-10-CM

## 2021-05-14 DIAGNOSIS — F41.8: ICD-10-CM

## 2021-05-14 DIAGNOSIS — Z76.0: Primary | ICD-10-CM

## 2021-05-14 DIAGNOSIS — Z88.8: ICD-10-CM

## 2021-05-14 DIAGNOSIS — E78.00: ICD-10-CM

## 2021-05-14 DIAGNOSIS — I10: ICD-10-CM

## 2021-05-14 NOTE — NUR
Pain Clinic Assessment:
 
1. History of Osteoarthritis:
KNEES
HIPS
back
   History of Rheumatoid Arthritis:
DENIES
 
2. Height: 6 ft. 3 in. 190.5 cm.
   Weight: 244.0 lb.  oz. 110.678 kg.
   Patient's BMI: 30.5
 
3. Vital Signs:
   BP: 133/76 Pulse: 65 Resp: 14
   Temp:  02 Sat: 98 ECG Mon:
 
4. Pain Intensity: 5
 
5. Fall Risk:
   Dizziness: N  Needs help standing or walking: N
   Fallen in the last 3 months: Y
   Fall risk comments:
 
 
6. Patient on Blood Thinner: None
 
7. History of Hypertension: Y
 
8. Opioid Therapy greater than 6 weeks: Y
   Opiate Contract Signed: 12/04/19
 
9. Risk Assessment Tool Provided: 1-LOW
 
10. Functional Assessment Tool: 62/70
 
11. Recreational Drug Use: Never Drug Type:
    Tobacco Use: Former Smoker Tobacco Type:
       Amount or Packs/day:  How Many Years:
    Alcohol Use: No  Frequency:  Quant:

## 2021-07-14 ENCOUNTER — HOSPITAL ENCOUNTER (OUTPATIENT)
Dept: HOSPITAL 35 - PAIN | Age: 66
End: 2021-07-14
Payer: COMMERCIAL

## 2021-07-14 VITALS — WEIGHT: 238.2 LBS | BODY MASS INDEX: 29.62 KG/M2 | HEIGHT: 75 IN

## 2021-07-14 VITALS — SYSTOLIC BLOOD PRESSURE: 124 MMHG | DIASTOLIC BLOOD PRESSURE: 69 MMHG

## 2021-07-14 DIAGNOSIS — F41.9: ICD-10-CM

## 2021-07-14 DIAGNOSIS — G89.29: Primary | ICD-10-CM

## 2021-07-14 DIAGNOSIS — M25.552: ICD-10-CM

## 2021-07-14 DIAGNOSIS — Z79.891: ICD-10-CM

## 2021-07-14 DIAGNOSIS — I10: ICD-10-CM

## 2021-07-14 DIAGNOSIS — M25.551: ICD-10-CM

## 2021-07-14 DIAGNOSIS — Z88.2: ICD-10-CM

## 2021-07-14 DIAGNOSIS — Z87.891: ICD-10-CM

## 2021-07-14 DIAGNOSIS — Z79.899: ICD-10-CM

## 2021-07-14 DIAGNOSIS — Z86.73: ICD-10-CM

## 2021-07-14 DIAGNOSIS — F32.9: ICD-10-CM

## 2021-07-14 DIAGNOSIS — E78.00: ICD-10-CM

## 2021-07-14 NOTE — NUR
Pain Clinic Assessment:
 
1. History of Osteoarthritis:
KNEES
HIPS
back
   History of Rheumatoid Arthritis:
DENIES
 
2. Height: 6 ft. 3 in. 190.5 cm.
   Weight: 238.2 lb.  oz. 108.047 kg.
   Patient's BMI: 29.8
 
3. Vital Signs:
   BP: 124/69 Pulse: 70 Resp: 14
   Temp:  02 Sat: 96 ECG Mon:
 
4. Pain Intensity: 5
 
5. Fall Risk:
   Dizziness: N  Needs help standing or walking: N
   Fallen in the last 3 months: N
   Fall risk comments:
 
 
6. Patient on Blood Thinner: None
 
7. History of Hypertension: Y
 
8. Opioid Therapy greater than 6 weeks: Y
   Opiate Contract Signed: 12/04/19
 
9. Risk Assessment Tool Provided: 1-LOW
 
10. Functional Assessment Tool: 62/70
 
11. Recreational Drug Use: Never Drug Type:
    Tobacco Use: Former Smoker Tobacco Type:
       Amount or Packs/day:  How Many Years:
    Alcohol Use: No  Frequency:  Quant:

## 2021-09-08 ENCOUNTER — HOSPITAL ENCOUNTER (OUTPATIENT)
Dept: HOSPITAL 35 - PAIN | Age: 66
End: 2021-09-08
Payer: COMMERCIAL

## 2021-09-08 VITALS — BODY MASS INDEX: 30.16 KG/M2 | WEIGHT: 242.6 LBS | HEIGHT: 75 IN

## 2021-09-08 VITALS — DIASTOLIC BLOOD PRESSURE: 84 MMHG | SYSTOLIC BLOOD PRESSURE: 137 MMHG

## 2021-09-08 DIAGNOSIS — F41.9: ICD-10-CM

## 2021-09-08 DIAGNOSIS — M19.90: ICD-10-CM

## 2021-09-08 DIAGNOSIS — Z79.891: ICD-10-CM

## 2021-09-08 DIAGNOSIS — E78.00: ICD-10-CM

## 2021-09-08 DIAGNOSIS — F32.9: ICD-10-CM

## 2021-09-08 DIAGNOSIS — I10: ICD-10-CM

## 2021-09-08 DIAGNOSIS — G89.29: Primary | ICD-10-CM

## 2021-09-08 DIAGNOSIS — Z86.73: ICD-10-CM

## 2021-09-08 NOTE — NUR
Pain Clinic Assessment:
 
1. History of Osteoarthritis:
KNEES
HIPS
back
   History of Rheumatoid Arthritis:
DENIES
 
2. Height: 6 ft. 3 in. 190.5 cm.
   Weight: 242.6 lb.  oz. 110.043 kg.
   Patient's BMI: 30.3
 
3. Vital Signs:
   BP: 137/84 Pulse: 56 Resp: 16
   Temp:  02 Sat: 98 ECG Mon:
 
4. Pain Intensity: 5
 
5. Fall Risk:
   Dizziness: N  Needs help standing or walking: N
   Fallen in the last 3 months: N
   Fall risk comments:
 
 
6. Patient on Blood Thinner: None
 
7. History of Hypertension: Y
 
8. Opioid Therapy greater than 6 weeks: Y
   Opiate Contract Signed: 12/04/19
 
9. Risk Assessment Tool Provided: 1-LOW
 
10. Functional Assessment Tool: 62/70
 
11. Recreational Drug Use: Never Drug Type:
    Tobacco Use: Former Smoker Tobacco Type:
       Amount or Packs/day:  How Many Years:
    Alcohol Use: No  Frequency:  Quant:

## 2021-11-12 ENCOUNTER — HOSPITAL ENCOUNTER (OUTPATIENT)
Dept: HOSPITAL 35 - PAIN | Age: 66
End: 2021-11-12
Payer: COMMERCIAL

## 2021-11-12 VITALS — HEIGHT: 75 IN | BODY MASS INDEX: 29.32 KG/M2 | WEIGHT: 235.8 LBS

## 2021-11-12 VITALS — DIASTOLIC BLOOD PRESSURE: 98 MMHG | SYSTOLIC BLOOD PRESSURE: 121 MMHG

## 2021-11-12 DIAGNOSIS — Z79.82: ICD-10-CM

## 2021-11-12 DIAGNOSIS — F41.8: ICD-10-CM

## 2021-11-12 DIAGNOSIS — M19.90: ICD-10-CM

## 2021-11-12 DIAGNOSIS — Z88.8: ICD-10-CM

## 2021-11-12 DIAGNOSIS — I10: ICD-10-CM

## 2021-11-12 DIAGNOSIS — G89.29: Primary | ICD-10-CM

## 2021-11-12 DIAGNOSIS — Z79.899: ICD-10-CM

## 2021-11-12 DIAGNOSIS — E78.00: ICD-10-CM

## 2021-11-12 DIAGNOSIS — Z86.73: ICD-10-CM

## 2021-11-12 NOTE — NUR
Pain Clinic Assessment:
 
1. History of Osteoarthritis:
KNEES
HIPS
back
   History of Rheumatoid Arthritis:
DENIES
 
2. Height: 6 ft. 3 in. 190.5 cm.
   Weight: 235.8 lb.  oz. 106.958 kg.
   Patient's BMI: 29.5
 
3. Vital Signs:
   BP: 121/98 Pulse: 67 Resp: 18
   Temp:  02 Sat: 100 ECG Mon:
 
4. Pain Intensity: 5
 
5. Fall Risk:
   Dizziness: N  Needs help standing or walking: N
   Fallen in the last 3 months: N
   Fall risk comments:
 
 
6. Patient on Blood Thinner: None
 
7. History of Hypertension: Y
 
8. Opioid Therapy greater than 6 weeks: Y
   Opiate Contract Signed: 12/04/19
 
9. Risk Assessment Tool Provided: 1-LOW
 
10. Functional Assessment Tool: 62/70
 
11. Recreational Drug Use: Never Drug Type:
    Tobacco Use: Former Smoker Tobacco Type:
       Amount or Packs/day:  How Many Years:
    Alcohol Use: No  Frequency:  Quant:

## 2022-01-05 ENCOUNTER — HOSPITAL ENCOUNTER (OUTPATIENT)
Dept: HOSPITAL 35 - PAIN | Age: 67
End: 2022-01-05
Payer: COMMERCIAL

## 2022-01-05 VITALS — WEIGHT: 242 LBS | BODY MASS INDEX: 30.09 KG/M2 | HEIGHT: 75 IN

## 2022-01-05 VITALS — SYSTOLIC BLOOD PRESSURE: 162 MMHG | DIASTOLIC BLOOD PRESSURE: 98 MMHG

## 2022-01-05 DIAGNOSIS — M19.91: ICD-10-CM

## 2022-01-05 DIAGNOSIS — Z79.899: ICD-10-CM

## 2022-01-05 DIAGNOSIS — E78.00: ICD-10-CM

## 2022-01-05 DIAGNOSIS — Z87.891: ICD-10-CM

## 2022-01-05 DIAGNOSIS — F34.89: ICD-10-CM

## 2022-01-05 DIAGNOSIS — Z88.8: ICD-10-CM

## 2022-01-05 DIAGNOSIS — F41.9: ICD-10-CM

## 2022-01-05 DIAGNOSIS — Z86.73: ICD-10-CM

## 2022-01-05 DIAGNOSIS — R51.9: ICD-10-CM

## 2022-01-05 DIAGNOSIS — G89.29: Primary | ICD-10-CM

## 2022-01-05 DIAGNOSIS — I10: ICD-10-CM

## 2022-01-05 DIAGNOSIS — M54.50: ICD-10-CM

## 2022-02-25 ENCOUNTER — HOSPITAL ENCOUNTER (OUTPATIENT)
Dept: HOSPITAL 35 - PAIN | Age: 67
End: 2022-02-25
Payer: COMMERCIAL

## 2022-02-25 VITALS — WEIGHT: 244 LBS | BODY MASS INDEX: 30.34 KG/M2 | HEIGHT: 75 IN

## 2022-02-25 VITALS — DIASTOLIC BLOOD PRESSURE: 81 MMHG | SYSTOLIC BLOOD PRESSURE: 133 MMHG

## 2022-02-25 DIAGNOSIS — F41.9: ICD-10-CM

## 2022-02-25 DIAGNOSIS — M25.551: ICD-10-CM

## 2022-02-25 DIAGNOSIS — Z96.643: ICD-10-CM

## 2022-02-25 DIAGNOSIS — Z88.8: ICD-10-CM

## 2022-02-25 DIAGNOSIS — E78.00: ICD-10-CM

## 2022-02-25 DIAGNOSIS — M25.552: ICD-10-CM

## 2022-02-25 DIAGNOSIS — I10: ICD-10-CM

## 2022-02-25 DIAGNOSIS — Z79.899: ICD-10-CM

## 2022-02-25 DIAGNOSIS — M19.90: ICD-10-CM

## 2022-02-25 DIAGNOSIS — M54.50: Primary | ICD-10-CM

## 2022-02-25 NOTE — NUR
Pain Clinic Assessment:
 
1. History of Osteoarthritis:
KNEES
HIPS
back
   History of Rheumatoid Arthritis:
DENIES
 
2. Height: 6 ft. 3 in. 190.5 cm.
   Weight: 244.0 lb.  oz. 110.678 kg.
   Patient's BMI: 30.5
 
3. Vital Signs:
   BP: 133/81 Pulse: 73 Resp: 16
   Temp:  02 Sat: 96 ECG Mon:
 
4. Pain Intensity: 5
 
5. Fall Risk:
   Dizziness: N  Needs help standing or walking: N
   Fallen in the last 3 months: Y
   Fall risk comments:
 
 
6. Patient on Blood Thinner: None
 
7. History of Hypertension: Y
 
8. Opioid Therapy greater than 6 weeks: Y
   Opiate Contract Signed: 12/04/19
 
9. Risk Assessment Tool Provided: 1-LOW
 
10. Functional Assessment Tool: 62/70
 
11. Recreational Drug Use: Never Drug Type:
    Tobacco Use: Former Smoker Tobacco Type:
       Amount or Packs/day:  How Many Years:
    Alcohol Use: No  Frequency:  Quant: